# Patient Record
Sex: FEMALE | Race: OTHER | Employment: OTHER | URBAN - METROPOLITAN AREA
[De-identification: names, ages, dates, MRNs, and addresses within clinical notes are randomized per-mention and may not be internally consistent; named-entity substitution may affect disease eponyms.]

---

## 2017-02-07 ENCOUNTER — APPOINTMENT (OUTPATIENT)
Dept: CT IMAGING | Age: 82
End: 2017-02-07
Attending: EMERGENCY MEDICINE
Payer: MEDICARE

## 2017-02-07 ENCOUNTER — APPOINTMENT (OUTPATIENT)
Dept: GENERAL RADIOLOGY | Age: 82
End: 2017-02-07
Attending: EMERGENCY MEDICINE
Payer: MEDICARE

## 2017-02-07 ENCOUNTER — HOSPITAL ENCOUNTER (EMERGENCY)
Age: 82
Discharge: HOME | End: 2017-02-07
Attending: EMERGENCY MEDICINE
Payer: MEDICARE

## 2017-02-07 VITALS
HEIGHT: 64 IN | BODY MASS INDEX: 23.22 KG/M2 | SYSTOLIC BLOOD PRESSURE: 138 MMHG | DIASTOLIC BLOOD PRESSURE: 75 MMHG | HEART RATE: 68 BPM | WEIGHT: 136 LBS | OXYGEN SATURATION: 94 % | TEMPERATURE: 98.5 F | RESPIRATION RATE: 16 BRPM

## 2017-02-07 DIAGNOSIS — G45.9 TRANSIENT CEREBRAL ISCHEMIA, UNSPECIFIED TYPE: Primary | ICD-10-CM

## 2017-02-07 LAB
ALBUMIN SERPL BCP-MCNC: 4.2 G/DL (ref 3.5–5)
ALBUMIN/GLOB SERPL: 1.4 {RATIO} (ref 1.1–2.2)
ALP SERPL-CCNC: 85 U/L (ref 45–117)
ALT SERPL-CCNC: 25 U/L (ref 12–78)
ANION GAP BLD CALC-SCNC: 8 MMOL/L (ref 5–15)
APPEARANCE UR: CLEAR
APTT PPP: 26.7 SEC (ref 22.1–32.5)
AST SERPL W P-5'-P-CCNC: 22 U/L (ref 15–37)
BACTERIA URNS QL MICRO: NEGATIVE /HPF
BASOPHILS # BLD AUTO: 0 K/UL (ref 0–0.1)
BASOPHILS # BLD: 0 % (ref 0–1)
BILIRUB SERPL-MCNC: 0.5 MG/DL (ref 0.2–1)
BILIRUB UR QL: NEGATIVE
BNP SERPL-MCNC: 66 PG/ML (ref 0–100)
BUN SERPL-MCNC: 15 MG/DL (ref 6–20)
BUN/CREAT SERPL: 20 (ref 12–20)
CALCIUM SERPL-MCNC: 9.3 MG/DL (ref 8.5–10.1)
CHLORIDE SERPL-SCNC: 94 MMOL/L (ref 97–108)
CK SERPL-CCNC: 110 U/L (ref 26–192)
CO2 SERPL-SCNC: 30 MMOL/L (ref 21–32)
COLOR UR: ABNORMAL
CREAT SERPL-MCNC: 0.76 MG/DL (ref 0.55–1.02)
EOSINOPHIL # BLD: 0.3 K/UL (ref 0–0.4)
EOSINOPHIL NFR BLD: 5 % (ref 0–7)
EPITH CASTS URNS QL MICRO: ABNORMAL /LPF
ERYTHROCYTE [DISTWIDTH] IN BLOOD BY AUTOMATED COUNT: 12.6 % (ref 11.5–14.5)
GLOBULIN SER CALC-MCNC: 3.1 G/DL (ref 2–4)
GLUCOSE BLD STRIP.AUTO-MCNC: 94 MG/DL (ref 65–100)
GLUCOSE SERPL-MCNC: 92 MG/DL (ref 65–100)
GLUCOSE UR STRIP.AUTO-MCNC: NEGATIVE MG/DL
HCT VFR BLD AUTO: 40.3 % (ref 35–47)
HGB BLD-MCNC: 13.5 G/DL (ref 11.5–16)
HGB UR QL STRIP: NEGATIVE
HYALINE CASTS URNS QL MICRO: ABNORMAL /LPF (ref 0–5)
INR BLD: <0.9 (ref 0.9–1.2)
INR PPP: 1 (ref 0.9–1.1)
KETONES UR QL STRIP.AUTO: NEGATIVE MG/DL
LEUKOCYTE ESTERASE UR QL STRIP.AUTO: ABNORMAL
LYMPHOCYTES # BLD AUTO: 19 % (ref 12–49)
LYMPHOCYTES # BLD: 1.2 K/UL (ref 0.8–3.5)
MCH RBC QN AUTO: 32.8 PG (ref 26–34)
MCHC RBC AUTO-ENTMCNC: 33.5 G/DL (ref 30–36.5)
MCV RBC AUTO: 97.8 FL (ref 80–99)
MONOCYTES # BLD: 0.7 K/UL (ref 0–1)
MONOCYTES NFR BLD AUTO: 11 % (ref 5–13)
NEUTS SEG # BLD: 4 K/UL (ref 1.8–8)
NEUTS SEG NFR BLD AUTO: 65 % (ref 32–75)
NITRITE UR QL STRIP.AUTO: NEGATIVE
PH UR STRIP: 7.5 [PH] (ref 5–8)
PLATELET # BLD AUTO: 207 K/UL (ref 150–400)
POTASSIUM SERPL-SCNC: 3.8 MMOL/L (ref 3.5–5.1)
PROT SERPL-MCNC: 7.3 G/DL (ref 6.4–8.2)
PROT UR STRIP-MCNC: NEGATIVE MG/DL
PROTHROMBIN TIME: 10.7 SEC (ref 9–11.1)
RBC # BLD AUTO: 4.12 M/UL (ref 3.8–5.2)
RBC #/AREA URNS HPF: ABNORMAL /HPF (ref 0–5)
SERVICE CMNT-IMP: NORMAL
SODIUM SERPL-SCNC: 132 MMOL/L (ref 136–145)
SP GR UR REFRACTOMETRY: 1.01 (ref 1–1.03)
THERAPEUTIC RANGE,PTTT: NORMAL SECS (ref 58–77)
TROPONIN I SERPL-MCNC: <0.04 NG/ML
UA: UC IF INDICATED,UAUC: ABNORMAL
UROBILINOGEN UR QL STRIP.AUTO: 0.2 EU/DL (ref 0.2–1)
WBC # BLD AUTO: 6.1 K/UL (ref 3.6–11)
WBC URNS QL MICRO: ABNORMAL /HPF (ref 0–4)

## 2017-02-07 PROCEDURE — 85025 COMPLETE CBC W/AUTO DIFF WBC: CPT | Performed by: EMERGENCY MEDICINE

## 2017-02-07 PROCEDURE — 80053 COMPREHEN METABOLIC PANEL: CPT | Performed by: EMERGENCY MEDICINE

## 2017-02-07 PROCEDURE — 70450 CT HEAD/BRAIN W/O DYE: CPT

## 2017-02-07 PROCEDURE — 93005 ELECTROCARDIOGRAM TRACING: CPT

## 2017-02-07 PROCEDURE — 71010 XR CHEST PORT: CPT

## 2017-02-07 PROCEDURE — 82550 ASSAY OF CK (CPK): CPT | Performed by: EMERGENCY MEDICINE

## 2017-02-07 PROCEDURE — 81001 URINALYSIS AUTO W/SCOPE: CPT | Performed by: EMERGENCY MEDICINE

## 2017-02-07 PROCEDURE — 87086 URINE CULTURE/COLONY COUNT: CPT | Performed by: EMERGENCY MEDICINE

## 2017-02-07 PROCEDURE — 99285 EMERGENCY DEPT VISIT HI MDM: CPT

## 2017-02-07 PROCEDURE — 83880 ASSAY OF NATRIURETIC PEPTIDE: CPT | Performed by: EMERGENCY MEDICINE

## 2017-02-07 PROCEDURE — 85730 THROMBOPLASTIN TIME PARTIAL: CPT | Performed by: EMERGENCY MEDICINE

## 2017-02-07 PROCEDURE — 84484 ASSAY OF TROPONIN QUANT: CPT | Performed by: EMERGENCY MEDICINE

## 2017-02-07 PROCEDURE — 85610 PROTHROMBIN TIME: CPT | Performed by: EMERGENCY MEDICINE

## 2017-02-07 PROCEDURE — 85610 PROTHROMBIN TIME: CPT

## 2017-02-07 PROCEDURE — 82962 GLUCOSE BLOOD TEST: CPT

## 2017-02-07 PROCEDURE — 36415 COLL VENOUS BLD VENIPUNCTURE: CPT | Performed by: EMERGENCY MEDICINE

## 2017-02-07 NOTE — ED TRIAGE NOTES
She was awakened with a phone call from her son at 4:25pm, he reaziled she was having trouble with her speech and feeling confused. She recognized this too. He went to her house where she still seemed to be having problems. She says she has a bit of a headache and now also says she has pressure in her chest. She has a hx of TIA with the last one being in May 2015, same type symtoms.

## 2017-02-07 NOTE — ED PROVIDER NOTES
HPI Comments: [de-identified] y.o. female with past medical history significant for TIA, PVC who presents with chief complaint of aphasia. Per relative, while on the cell phone with the pt at 1625 (1.5 hours ago), the pt had aphasia in the form of word finding issues which lasted for one hour, even when other family members arrived on scene. Pt reports hx of similar TIA's and says that her last one was 2015 (2 years ago). She reports that her sxs have resolved upon ED arrival but that she has a small headache and a \"fullness\" in her chest but no actual CP currently. She reports being on blood thinners but does not remember which one. Pt denies facial droop, drooling, weakness, gait problem, CP. There are no other acute medical concerns at this time. PCP: Druscilla Gowers, MD    Note written by Sagar Strong, as dictated by Yoselin Temple MD 6:10 PM      The history is provided by the patient. Past Medical History:   Diagnosis Date    Arthritis     Glaucoma 12/18/2013    Hyperlipidemia 8/29/2012    Postmenopausal HRT (hormone replacement therapy) 8/29/2012    PVC (premature ventricular contraction) 1/7/2015    TIA (transient ischemic attack) 5/1/2015    Uterovaginal prolapse 2/3/2012       Past Surgical History:   Procedure Laterality Date    Hx orthopaedic       RT. BUNIONECTOMY    Hx orthopaedic       CALL'S NEUROMA    Hx hysterectomy       X 4    Hx gi       COLONOSCOPY    Hx breast biopsy       CARMEL.  Hx tonsillectomy           Family History:   Problem Relation Age of Onset    Heart Attack Mother 80       Social History     Social History    Marital status:      Spouse name: N/A    Number of children: N/A    Years of education: N/A     Occupational History    Not on file.      Social History Main Topics    Smoking status: Former Smoker     Packs/day: 1.00     Years: 14.00     Quit date: 1/27/1967    Smokeless tobacco: Never Used    Alcohol use Yes      Comment: AVG 2X A MONTH  Drug use: No    Sexual activity: Not on file     Other Topics Concern    Not on file     Social History Narrative         ALLERGIES: Review of patient's allergies indicates no known allergies. Review of Systems   Cardiovascular: Negative for chest pain. \"Chest fullness\"   Musculoskeletal: Negative for gait problem. Neurological: Positive for speech difficulty and headaches. Negative for facial asymmetry and weakness. Psychiatric/Behavioral: Positive for confusion. All other systems reviewed and are negative. Vitals:    02/07/17 1754 02/07/17 1900 [birth year]1937 02/07/17 1939   BP: 182/89 147/76 155/72    Pulse: 70 72  76   Resp: 20 17 14   Temp: 98.2 °F (36.8 °C)      SpO2: 98% 98%  100%   Weight: 61.7 kg (136 lb)      Height: 5' 4\" (1.626 m)               Physical Exam   Constitutional: She is oriented to person, place, and time. She appears well-developed and well-nourished. No distress. HENT:   Head: Normocephalic and atraumatic. Eyes: Conjunctivae are normal. No scleral icterus. Neck: Neck supple. Carotid bruit is not present. No tracheal deviation present. Cardiovascular: Normal rate, regular rhythm, normal heart sounds and intact distal pulses. Exam reveals no gallop and no friction rub. No murmur heard. No murmur heard   Pulmonary/Chest: Effort normal and breath sounds normal. She has no wheezes. She has no rales. Abdominal: Soft. She exhibits no distension. There is no tenderness. There is no rebound and no guarding. Musculoskeletal: She exhibits no edema. Neurological: She is alert and oriented to person, place, and time. She has normal strength. No cranial nerve deficit or sensory deficit. She displays a negative Romberg sign. No focal neuro deficits, no pronator drift, normal finger nose finger. No facial droop or aphasia currently. Skin: Skin is warm and dry. No rash noted. Psychiatric: She has a normal mood and affect.    Nursing note and vitals reviewed. Note written by Sagar Tanner, as dictated by Winnie Mejia MD 6:10 PM      [facility]  ED Course       Procedures    CONSULT NOTE:  6:17 PM Winnie Mejia MD spoke with Dr. Nikki Summers, Consult for Neurology. Discussed available diagnostic tests and clinical findings. He agrees to treat pt as TIA workup based on clinical history and recommends admission. CONSULT NOTE:  Valerie Escobar MD spoke with Dr. Estela Freeman, Consult for PCP. Discussed available diagnostic tests and clinical findings. ED EKG interpretation:  Rhythm: normal sinus rhythm; and regular . Rate (approx.): 65; Axis: normal; ST/T wave: no acute changes  Note written by Sagar Tanner, as dictated by Winnie Mejia MD 6:38 PM     PROGRESS NOTE:  7:31 PM  Dr. Matt Hernadez has seen and evaluated the pt, she has had multiple TIA's in the past and full workup, he recommends that pt does not need admission and will discharge pt home. PROGRESS NOTE:  8:19 PM  Pt was discharged per Dr. Matt Hernadez, we will give pt paperwork and she is to follow up with Dr. Matt Hernadez in one day.

## 2017-02-07 NOTE — PROGRESS NOTES
Patient in CT and no family present at time of visit    Grace Velasquez M.S., M.Div.   [address] [facility] ([id number])

## 2017-02-08 LAB
ATRIAL RATE: 65 BPM
CALCULATED P AXIS, ECG09: 81 DEGREES
CALCULATED R AXIS, ECG10: 17 DEGREES
CALCULATED T AXIS, ECG11: 33 DEGREES
DIAGNOSIS, 93000: NORMAL
P-R INTERVAL, ECG05: 148 MS
Q-T INTERVAL, ECG07: 410 MS
QRS DURATION, ECG06: 74 MS
QTC CALCULATION (BEZET), ECG08: 426 MS
VENTRICULAR RATE, ECG03: 65 BPM

## 2017-02-08 NOTE — DISCHARGE INSTRUCTIONS
We hope that we have addressed all of your medical concerns. The examination and treatment you received in the Emergency Department were for an emergent problem and were not intended as complete care. It is important that you follow up with your healthcare provider(s) for ongoing care. If your symptoms worsen or do not improve as expected, and you are unable to reach your usual health care provider(s), you should return to the Emergency Department. Today's healthcare is undergoing tremendous change, and patient satisfaction surveys are one of the many tools to assess the quality of medical care. You may receive a survey from the [employer] organization regarding your experience in the Emergency Department. I hope that your experience has been completely positive, particularly the medical care that I provided. As such, please participate in the survey; anything less than excellent does not meet my expectations or intentions. [facility] and [facility] participate in nationally recognized quality of care measures. If your blood pressure is greater than 120/80, as reported below, we urge that you seek medical care to address the potential of high blood pressure, commonly known as hypertension. Hypertension can be hereditary or can be caused by certain medical conditions, pain, stress, or \"white coat syndrome. \"       Please make an appointment with your health care provider(s) for follow up of your Emergency Department visit. VITALS:   Patient Vitals for the past 8 hrs:   Temp Pulse Resp BP SpO2   02/07/17 1939 - 76 14 - 100 %   [birth year]/1937 - - - 155/72 -   02/07/17 1900 - 72 17 147/76 98 %   02/07/17 1754 98.2 °F (36.8 °C) 70 20 182/89 98 %          Thank you for allowing us to provide you with medical care today. We realize that you have many choices for your emergency care needs.   Please choose us in the future for any continued health care nicanor Goemz MD    [address] [facility].   Office: [phone]            Recent Results (from the past 24 hour(s))   GLUCOSE, POC    Collection Time: 02/07/17  6:03 PM   Result Value Ref Range    Glucose (POC) 94 65 - 100 mg/dL    Performed by Douglsa Sharpe    POC INR    Collection Time: 02/07/17  6:03 PM   Result Value Ref Range    INR (POC) <0.9 <1.2   CBC WITH AUTOMATED DIFF    Collection Time: 02/07/17  6:23 PM   Result Value Ref Range    WBC 6.1 3.6 - 11.0 K/uL    RBC 4.12 3.80 - 5.20 M/uL    HGB 13.5 11.5 - 16.0 g/dL    HCT 40.3 35.0 - 47.0 %    MCV 97.8 80.0 - 99.0 FL    MCH 32.8 26.0 - 34.0 PG    MCHC 33.5 30.0 - 36.5 g/dL    RDW 12.6 11.5 - 14.5 %    PLATELET 202 472 - 852 K/uL    NEUTROPHILS 65 32 - 75 %    LYMPHOCYTES 19 12 - 49 %    MONOCYTES 11 5 - 13 %    EOSINOPHILS 5 0 - 7 %    BASOPHILS 0 0 - 1 %    ABS. NEUTROPHILS 4.0 1.8 - 8.0 K/UL    ABS. LYMPHOCYTES 1.2 0.8 - 3.5 K/UL    ABS. MONOCYTES 0.7 0.0 - 1.0 K/UL    ABS. EOSINOPHILS 0.3 0.0 - 0.4 K/UL    ABS. BASOPHILS 0.0 0.0 - 0.1 K/UL   METABOLIC PANEL, COMPREHENSIVE    Collection Time: 02/07/17  6:23 PM   Result Value Ref Range    Sodium 132 (L) 136 - 145 mmol/L    Potassium 3.8 3.5 - 5.1 mmol/L    Chloride 94 (L) 97 - 108 mmol/L    CO2 30 21 - 32 mmol/L    Anion gap 8 5 - 15 mmol/L    Glucose 92 65 - 100 mg/dL    BUN 15 6 - 20 MG/DL    Creatinine 0.76 0.55 - 1.02 MG/DL    BUN/Creatinine ratio 20 12 - 20      GFR est AA >60 >60 ml/min/1.73m2    GFR est non-AA >60 >60 ml/min/1.73m2    Calcium 9.3 8.5 - 10.1 MG/DL    Bilirubin, total 0.5 0.2 - 1.0 MG/DL    ALT (SGPT) 25 12 - 78 U/L    AST (SGOT) 22 15 - 37 U/L    Alk.  phosphatase 85 45 - 117 U/L    Protein, total 7.3 6.4 - 8.2 g/dL    Albumin 4.2 3.5 - 5.0 g/dL    Globulin 3.1 2.0 - 4.0 g/dL    A-G Ratio 1.4 1.1 - 2.2     CK W/ REFLX CKMB    Collection Time: 02/07/17  6:23 PM   Result Value Ref Range     26 - 192 U/L   TROPONIN I Collection Time: 02/07/17  6:23 PM   Result Value Ref Range    Troponin-I, Qt. <0.04 <0.05 ng/mL   PTT    Collection Time: 02/07/17  6:23 PM   Result Value Ref Range    aPTT 26.7 22.1 - 32.5 sec    aPTT, therapeutic range     58.0 - 77.0 SECS   PROTHROMBIN TIME + INR    Collection Time: 02/07/17  6:23 PM   Result Value Ref Range    INR 1.0 0.9 - 1.1      Prothrombin time 10.7 9.0 - 11.1 sec   BNP    Collection Time: 02/07/17  6:23 PM   Result Value Ref Range    BNP 66 0 - 100 pg/mL   EKG, 12 LEAD, INITIAL    Collection Time: 02/07/17  6:25 PM   Result Value Ref Range    Ventricular Rate 65 BPM    Atrial Rate 65 BPM    P-R Interval 148 ms    QRS Duration 74 ms    Q-T Interval 410 ms    QTC Calculation (Bezet) 426 ms    Calculated P Axis 81 degrees    Calculated R Axis 17 degrees    Calculated T Axis 33 degrees    Diagnosis       Normal sinus rhythm  When compared with ECG of 01-MAY-2015 11:16,  No significant change was found     URINALYSIS W/ REFLEX CULTURE    Collection Time: 02/07/17  7:46 PM   Result Value Ref Range    Color YELLOW/STRAW      Appearance CLEAR CLEAR      Specific gravity 1.009 1.003 - 1.030      pH (UA) 7.5 5.0 - 8.0      Protein NEGATIVE  NEG mg/dL    Glucose NEGATIVE  NEG mg/dL    Ketone NEGATIVE  NEG mg/dL    Bilirubin NEGATIVE  NEG      Blood NEGATIVE  NEG      Urobilinogen 0.2 0.2 - 1.0 EU/dL    Nitrites NEGATIVE  NEG      Leukocyte Esterase LARGE (A) NEG      WBC 10-20 0 - 4 /hpf    RBC 0-5 0 - 5 /hpf    Epithelial cells FEW FEW /lpf    Bacteria NEGATIVE  NEG /hpf    UA:UC IF INDICATED URINE CULTURE ORDERED (A) CNI      Hyaline cast 0-2 0 - 5 /lpf       Xr Chest Port    Result Date: 2/7/2017  INDICATION: Garbled speech and confusion. TIA. COMPARISON: 5/1/2015 A single frontal view was obtained. The time of this study is 1926 hours. Lungs are clear. Heart size normal. Atherosclerotic change of the aorta is noted. ..      IMPRESSION: No acute finding    Ct Code Neuro Head Wo Contrast    Result Date: 2/7/2017  EXAM:  CT CODE NEURO HEAD WO CONTRAST INDICATION:   confusion since 4:25pm COMPARISON: 5/1/2015. TECHNIQUE: Unenhanced CT of the head was performed using 5 mm images. Brain and bone windows were generated. Sagittal and coronal reformatted images were also generated. CT dose reduction was achieved through the use of a standardized protocol tailored for this examination and automatic exposure control for dose modulation. FINDINGS: The ventricles and sulci are normal in size, shape and configuration and midline. There is mild hypodensity of the cerebral white matter, likely due to intracranial small vessel disease. No hyperdense vessels are seen. . There is no intracranial hemorrhage, extra-axial collection, mass, mass effect or midline shift. The basilar cisterns are open. No acute infarct is identified. The bone windows demonstrate no abnormalities. The visualized portions of the paranasal sinuses and mastoid air cells are clear. IMPRESSION: No acute findings. No change since the previous study.

## 2017-02-08 NOTE — CONSULTS
History and Physical    Subjective:     Dang Anderson is a [de-identified] y.o. WF with 8 year hx of recurrent spells/TIA with episodes of confusion, slurring of speech treated initially with asa and for the past 2 yrs plavix. Today sons observed a 60 minute spelll of expressive aphasia after gardening, She has made a full return to baseline since arriving in er and head ct wnl Carotid flow studies have been unremarkable in the past She is maintained on statin,. During myi interiview she can rapidly say mon of the year in reverse, Ekg is unemarkable. Lengthy discussion with patient I offered observation but they prefer to return home. Past Medical History   Diagnosis Date    Arthritis     Glaucoma 12/18/2013    Hyperlipidemia 8/29/2012    Postmenopausal HRT (hormone replacement therapy) 8/29/2012    PVC (premature ventricular contraction) 1/7/2015    TIA (transient ischemic attack) 5/1/2015    Uterovaginal prolapse 2/3/2012      Past Surgical History   Procedure Laterality Date    Hx orthopaedic       RT. BUNIONECTOMY    Hx orthopaedic       CALL'S NEUROMA    Hx hysterectomy       X 4    Hx gi       COLONOSCOPY    Hx breast biopsy       CARMEL.  Hx tonsillectomy       Family History   Problem Relation Age of Onset    Heart Attack Mother 80      Social History   Substance Use Topics    Smoking status: Former Smoker     Packs/day: 1.00     Years: 14.00     Quit date: 1/27/1967    Smokeless tobacco: Never Used    Alcohol use Yes      Comment: AVG 2X A MONTH       Prior to Admission medications    Medication Sig Start Date End Date Taking? Authorizing Provider   clopidogrel (PLAVIX) 75 mg tablet Take 1 Tab by mouth daily. 8/22/16   Pinky Damon MD   simvastatin (ZOCOR) 40 mg tablet TAKE 1 TABLET NIGHTLY 1/13/16   C Mikel Morris MD   oxybutynin (DITROPAN) 5 mg tablet Take 1 Tab by mouth three (3) times daily.  11/13/15   Pinky Damon MD   latanoprost (XALATAN) 0.005 % ophthalmic solution Administer 1 Drop to both eyes nightly. Historical Provider     No Known Allergies     Review of Systems:  A comprehensive review of systems was negative except for that written in the History of Present Illness. Code status     Objective:   Patient Vitals for the past 8 hrs:   BP Temp Pulse Resp SpO2 Height Weight   02/07/17 1900 147/76 - 72 17 98 % - -   02/07/17 1754 182/89 98.2 °F (36.8 °C) 70 20 98 % 5' 4\" (1.626 m) 136 lb (61.7 kg)         Physical Exam:   Heent -perrla eom intadt  Neck -carotiids 2+ no bruit  Breasts-  Lungs -clear  Heart -reg, no murmur  Abd -neg  Ext -no edema  Neuro-fully intadt        Data Review:   Recent Results (from the past 24 hour(s))   GLUCOSE, POC    Collection Time: 02/07/17  6:03 PM   Result Value Ref Range    Glucose (POC) 94 65 - 100 mg/dL    Performed by Geovanna Sands    POC INR    Collection Time: 02/07/17  6:03 PM   Result Value Ref Range    INR (POC) <0.9 <1.2   CBC WITH AUTOMATED DIFF    Collection Time: 02/07/17  6:23 PM   Result Value Ref Range    WBC 6.1 3.6 - 11.0 K/uL    RBC 4.12 3.80 - 5.20 M/uL    HGB 13.5 11.5 - 16.0 g/dL    HCT 40.3 35.0 - 47.0 %    MCV 97.8 80.0 - 99.0 FL    MCH 32.8 26.0 - 34.0 PG    MCHC 33.5 30.0 - 36.5 g/dL    RDW 12.6 11.5 - 14.5 %    PLATELET 369 047 - 552 K/uL    NEUTROPHILS 65 32 - 75 %    LYMPHOCYTES 19 12 - 49 %    MONOCYTES 11 5 - 13 %    EOSINOPHILS 5 0 - 7 %    BASOPHILS 0 0 - 1 %    ABS. NEUTROPHILS 4.0 1.8 - 8.0 K/UL    ABS. LYMPHOCYTES 1.2 0.8 - 3.5 K/UL    ABS. MONOCYTES 0.7 0.0 - 1.0 K/UL    ABS. EOSINOPHILS 0.3 0.0 - 0.4 K/UL    ABS.  BASOPHILS 0.0 0.0 - 0.1 K/UL   METABOLIC PANEL, COMPREHENSIVE    Collection Time: 02/07/17  6:23 PM   Result Value Ref Range    Sodium 132 (L) 136 - 145 mmol/L    Potassium 3.8 3.5 - 5.1 mmol/L    Chloride 94 (L) 97 - 108 mmol/L    CO2 30 21 - 32 mmol/L    Anion gap 8 5 - 15 mmol/L    Glucose 92 65 - 100 mg/dL    BUN 15 6 - 20 MG/DL    Creatinine 0.76 0.55 - 1.02 MG/DL    BUN/Creatinine ratio 20 12 - 20      GFR est AA >60 >60 ml/min/1.73m2    GFR est non-AA >60 >60 ml/min/1.73m2    Calcium 9.3 8.5 - 10.1 MG/DL    Bilirubin, total 0.5 0.2 - 1.0 MG/DL    ALT (SGPT) 25 12 - 78 U/L    AST (SGOT) 22 15 - 37 U/L    Alk. phosphatase 85 45 - 117 U/L    Protein, total 7.3 6.4 - 8.2 g/dL    Albumin 4.2 3.5 - 5.0 g/dL    Globulin 3.1 2.0 - 4.0 g/dL    A-G Ratio 1.4 1.1 - 2.2     CK W/ REFLX CKMB    Collection Time: 02/07/17  6:23 PM   Result Value Ref Range     26 - 192 U/L   TROPONIN I    Collection Time: 02/07/17  6:23 PM   Result Value Ref Range    Troponin-I, Qt. <0.04 <0.05 ng/mL   PTT    Collection Time: 02/07/17  6:23 PM   Result Value Ref Range    aPTT 26.7 22.1 - 32.5 sec    aPTT, therapeutic range     58.0 - 77.0 SECS   PROTHROMBIN TIME + INR    Collection Time: 02/07/17  6:23 PM   Result Value Ref Range    INR 1.0 0.9 - 1.1      Prothrombin time 10.7 9.0 - 11.1 sec   EKG, 12 LEAD, INITIAL    Collection Time: 02/07/17  6:25 PM   Result Value Ref Range    Ventricular Rate 65 BPM    Atrial Rate 65 BPM    P-R Interval 148 ms    QRS Duration 74 ms    Q-T Interval 410 ms    QTC Calculation (Bezet) 426 ms    Calculated P Axis 81 degrees    Calculated R Axis 17 degrees    Calculated T Axis 33 degrees    Diagnosis       Normal sinus rhythm  When compared with ECG of 01-MAY-2015 11:16,  No significant change was found         Assessment:     Principal Problem:    TIA (transient ischemic attack) (5/1/2015)        Plan:      Add babv asa and will see in the office 6 days no driving for time being    Signed By: Zo Patel MD     February 7, 2017

## 2017-02-09 LAB
BACTERIA SPEC CULT: NORMAL
CC UR VC: NORMAL
SERVICE CMNT-IMP: NORMAL

## 2018-05-09 ENCOUNTER — OFFICE VISIT (OUTPATIENT)
Dept: NEUROLOGY | Age: 83
End: 2018-05-09

## 2018-05-09 VITALS
HEART RATE: 56 BPM | SYSTOLIC BLOOD PRESSURE: 130 MMHG | OXYGEN SATURATION: 98 % | WEIGHT: 137 LBS | BODY MASS INDEX: 23.52 KG/M2 | DIASTOLIC BLOOD PRESSURE: 72 MMHG | RESPIRATION RATE: 18 BRPM

## 2018-05-09 DIAGNOSIS — G45.1 HEMISPHERIC CAROTID ARTERY SYNDROME: Primary | ICD-10-CM

## 2018-05-09 DIAGNOSIS — E78.5 HYPERLIPIDEMIA, UNSPECIFIED HYPERLIPIDEMIA TYPE: ICD-10-CM

## 2018-05-09 DIAGNOSIS — G60.9 IDIOPATHIC POLYNEUROPATHY: ICD-10-CM

## 2018-05-09 NOTE — PROGRESS NOTES
NEUROSCIENCE [facility]   NEW PATIENT EVALUATION/CONSULTATION       PATIENT NAME: Medina Gonzalez    MRN: [id number]    REASON FOR CONSULTATION: TIA/Stroke    05/09/18      Previous records (physician notes, laboratory reports, and radiology reports) and imaging studies were reviewed and summarized. My recommendations will be communicated back to the patient's physician(s) via electronic medical record and/or by [address] mail. HISTORY OF PRESENT ILLNESS:  Medina Gonzalez is a 82 y.o. right handed female presenting for evaluation of TIA/Stroke. Pt reports 1 week ago she awoke without sx. She started experiencing a severe occipital headache, slight nausea. She went to sleep and awoke later with persistent headache. Her friend who is a retired dentist noted that her speech was slightly irregular (fluent aphasia) that day. No noted focal weakness, paresthesias, dysarthria, vision deficits. Her headache was improved the following day and speech was back to baseline. She had been taking ASA daily up until her recent PCP visit. Now on Plavix monotherapy and doing well this. No recurrence of speech deficits. Of note, she has had a h/o TIAs in 2009, 2015 and 2017. Her prior events were all associated with fluent aphasia as well as severe headache. Neuroimaging has been negative during previous evaluations. PAST MEDICAL HISTORY:  Past Medical History:   Diagnosis Date    Arthritis     Cancer ([facility] [address]     squamous cell left leg    Glaucoma 12/18/2013    Hyperlipidemia 8/29/2012    Postmenopausal HRT (hormone replacement therapy) 8/29/2012    PVC (premature ventricular contraction) 1/7/2015    TIA (transient ischemic attack) 5/1/2015    Uterovaginal prolapse 2/3/2012       PAST SURGICAL HISTORY:  Past Surgical History:   Procedure Laterality Date    HX BREAST BIOPSY      CARMEL.     HX GI      COLONOSCOPY    HX HYSTERECTOMY      X 4    HX ORTHOPAEDIC      RT. BUNIONECTOMY    HX ORTHOPAEDIC      CALL'S NEUROMA    HX TONSILLECTOMY         FAMILY HISTORY:   Family History   Problem Relation Age of Onset    Heart Attack Mother 80         SOCIAL HISTORY:  Social History     Social History    Marital status:      Spouse name: N/A    Number of children: N/A    Years of education: N/A     Social History Main Topics    Smoking status: Former Smoker     Packs/day: 1.00     Years: 14.00     Quit date: 1/27/1967    Smokeless tobacco: Never Used    Alcohol use Yes      Comment: AVG 2X A MONTH    Drug use: No    Sexual activity: Not on file     Other Topics Concern    Not on file     Social History Narrative         MEDICATIONS:   Current Outpatient Prescriptions   Medication Sig Dispense Refill    clopidogrel (PLAVIX) 75 mg tab Take 1 Tab by mouth daily. 30 Tab 11    oxybutynin (DITROPAN) 5 mg tablet TAKE 1 TABLET THREE TIMES A  Tab 2    PARoxetine (PAXIL) 10 mg tablet Take 1 Tab by mouth daily. 90 Tab 4    simvastatin (ZOCOR) 40 mg tablet TAKE 1 TABLET NIGHTLY 90 Tab 4    aspirin 81 mg chewable tablet Take 2 Tabs by mouth daily. 100 Tab 11    latanoprost (XALATAN) 0.005 % ophthalmic solution Administer 1 Drop to both eyes nightly. ALLERGIES:  No Known Allergies      REVIEW OF SYSTEMS:  10 point ROS reviewed with patient. Please see scanned document under media. PHYSICAL EXAM:  Vital Signs:   Visit Vitals    /72    Pulse (!) 56    Resp 18    Wt 62.1 kg (137 lb)    SpO2 98%    BMI 23.52 kg/m2        General Medical Exam:  General:  Well appearing, comfortable, in no apparent distress. Eyes/ENT: see cranial nerve examination. Neck: No masses appreciated. Full range of motion without tenderness. Respiratory:  Clear to auscultation, good air entry bilaterally. Cardiac:  Regular rate and rhythm, no murmur. GI:  Soft, non-tender, non-distended abdomen. Bowel sounds normal. No masses, organomegaly. Extremities:  No deformities, edema, or skin discoloration. Skin: No rashes or lesions. Neurological:  · Mental Status:  Alert and oriented to person, place, and time with fluent speech. · Cranial Nerves:   CNII/III/IV/VI: visual fields full to confrontation, EOMI, PERRL, no ptosis or nystagmus. CN V: Facial sensation intact bilaterally, masseter 5/5   CN VII: Facial muscles symmetric and strong   CN VIII: Hears finger rub well bilaterally, intact vestibular function   CN IX/X: Normal palatal movement   CN XI: Full strength shoulder shrug bilaterally   CN XII: Tongue protrusion full and midline without fasciculation or atrophy  · Motor: Normal tone and muscle bulk with no pronator drift. No atrophy or fasciculations present on examination. Individual muscle group testing:  Shoulder abduction:   Left:5/5   Right : 5/5    Shoulder adduction:   Left:5/5   Right : 5/5    Elbow flexion:      Left:5/5   Right : 5/5  Elbow extension:    Left:5/5   Right : 5/5   Wrist flexion:    Left:5/5   Right : 5/5  Wrist extension:    Left:5/5   Right : 5/5  Arm pronation:   Left:5/5   Right : 5/5  Arm supination:   Left:5/5   Right : 5/5    Finger flexion:    Left:5/5   Right : 5/5    Finger extension:   Left:5/5   Right : 5/5   Finger abduction:  Left:5/5   Right : 5/5   Finger adduction:   Left:5/5   Right : 5/5  Hip flexion:     Left:5/5   Right : 5/5         Hip extension:   Left:5/5   Right : 5/5    Knee flexion:     Left:5/5   Right : 5/5    Knee extension:   Left:5/5   Right : 5/5    Dorsiflexion:     Left:5/5   Right : 5/5  Plantar flexion:    Left:5/5   Right : 5/5      · MSRs: No crossed adductors or clonus. RIGHT  LEFT   Brachioradialis 2+ 2+   Biceps 2+ 2+   Triceps 2+ 2+   Knee 2+ 2+   Achilles 0 0        Plantar response Downward Downward          · Sensation: Absent vibration to knees, decreased pinprick to mid calves, preserved proprioception/LT; (-) Romberg. · Coordination: No dysmetria.  Normal rapid alternating movements; finger-to-nose and heel-to- shin testing are within normal limits. · Gait: Normal native gait    PERTINENT DATA:  INTERNAL RECORDS:  The patient's electronic medical record was reviewed. The relevant details include:  CT Results (maximum last 3): Results from [place] encounter on 02/07/17   CT CODE NEURO HEAD WO CONTRAST   Narrative EXAM:  CT CODE NEURO HEAD WO CONTRAST    INDICATION:   confusion since 4:25pm    COMPARISON: 5/1/2015. TECHNIQUE: Unenhanced CT of the head was performed using 5 mm images. Brain and  bone windows were generated. Sagittal and coronal reformatted images were also  generated. CT dose reduction was achieved through the use of a standardized  protocol tailored for this examination and automatic exposure control for dose  modulation. FINDINGS:  The ventricles and sulci are normal in size, shape and configuration and  midline. There is mild hypodensity of the cerebral white matter, likely due to  intracranial small vessel disease. No hyperdense vessels are seen. . There is no  intracranial hemorrhage, extra-axial collection, mass, mass effect or midline  shift. The basilar cisterns are open. No acute infarct is identified. The bone  windows demonstrate no abnormalities. The visualized portions of the paranasal  sinuses and mastoid air cells are clear. Impression IMPRESSION: No acute findings. No change since the previous study. MRI Results (maximum last 3): Results from [place] encounter on 05/01/15   MRI BRAIN W WO CONT   Narrative **Final Report**      ICD Codes / Adm. Diagnosis: 435.9  929232 / Unspecified transient cerebral    Lethargy  Examination:  MR BRAIN W AND WO CON  - [id number] - May  1 2015  9:38PM  Accession No:  [id number]  Reason:  CVA      REPORT:  *PRELIMINARY REPORT*    No visualized emergent process. Preliminary report was provided by Dr. Darling Ashton. Final report to follow. *END PRELIMINARY REPORT*    INDICATION: CVA.   EXAM: Sagittal and axial and coronal images were obtained through the brain   in varying sequences. T1-weighted, T2-weighted, FLAIR, GRE,   Diffusion-weighted sequences may be utilized. Images were obtained before and after IV contrast administration. FINDINGS:    Comparison study: 3/27/2009. No    Sagittal images demonstrate signal void to be present in the cavernous   carotid arteries bilaterally. The cerebellar tonsils are in a normal   position. Axial and coronal images demonstrate multifocal and confluent hyperintensity   in the periventricular deep white matter and centrum semiovale likely   microvascular changes related to aging. No confluent infarct or hemorrhage   or mass effect are identified. Images through the visualized orbits and   sella and cavernous sinus and ventricles are within normal limits. Postcontrast images demonstrate no specific abnormal enhancement. Diffusion-weighted images demonstrate no acute infarct. IMPRESSION:    No acute infarct or hemorrhage or mass effect. Signing/Reading Doctor: Elvin Ortiz ([id number])    Approved: Barbara Do ([id number])  May  2 2015  5:40AM                              Results from [facility] encounter on 03/26/09   MRA NECK W/ CONT   Narrative Final Report          ICD Codes / Adm. Diagnosis:    /   Verl Suyapa  Examination:  NECK MRA W CONTRAST  - [id number] - Mar 28 2009  9:58AM    Accession No:  [id number]  Reason:  TIA VS CVA      REPORT:  HISTORY: Stroke. COMMENTS: Following IV administration 20 mL Magnevist, MR arteriography of   the neck is performed with multiplanar reformations. There is a normal appearance of the flow pattern of the great vessels at   their origins. There is normal flow shown throughout the vertebral arteries. There is normal flow demonstrated in the common carotid arteries   bilaterally; there is no substantial stenosis of the internal or external   carotid arteries.        IMPRESSION: Normal MR arteriography of the neck. Interpreting/Reading Doctor: Jen Alberto ([id number])  Transcribed: n/a on 03/28/2009  Approved: ROSEMARY Alberto ([id number])  03/28/2009          Distribution:  Attending Doctor: Love Britt  Alternate Doctor: Love Britt          MRA HEAD W/O CONT   Narrative Final Report          ICD Codes / Adm. Diagnosis:    /   Jerman Jarquin CONTRAST  - [id number] - Mar 28 2009  9:58AM    Accession No:  [id number]  Reason:  TIA VS CVA      REPORT:  HISTORY: Stroke. Headaches. Impaired vision. COMMENT: Unenhanced MR arteriography of the head is performed with   multiplanar reformations. Codominant vertebral arteries and basilar artery show normal flow as do the   posterior cerebral arteries. There is normal flow in the internal carotid   artery siphons as well as the anterior and middle cerebral arteries   bilaterally. IMPRESSION: Normal MR arteriogram of the [place]. Interpreting/Reading Doctor: Jen Alberto ([id number])  Transcribed: n/a on 03/28/2009  Approved: ROSEMARY Alberto ([id number])  03/28/2009          Distribution:  Attending Doctor: Love Britt  Alternate Doctor: Love Britt            VAS/US Results (maximum last 3): Results from [place] encounter on 05/01/15   DUPLEX CAROTID BILATERAL      ASSESSMENT:      ICD-10-CM ICD-9-CM    1. Hemispheric carotid artery syndrome G45.1 435.8 MRI BRAIN WO CONT      MRA BRAIN WO CONT      MRA NECK WO CONT      2D ECHO COMPLETE ADULT (TTE) W OR WO CONTR      HEMOGLOBIN A1C W/O EAG      LIPID PANEL      VITAMIN B12      IMMUNOELECTROPHORESIS (IMMUNOFIX.)   2. Hyperlipidemia, unspecified hyperlipidemia type E78.5 272.4 MRI BRAIN WO CONT      MRA BRAIN WO CONT      MRA NECK WO CONT      2D ECHO COMPLETE ADULT (TTE) W OR WO CONTR      HEMOGLOBIN A1C W/O EAG      LIPID PANEL      VITAMIN B12      IMMUNOELECTROPHORESIS (IMMUNOFIX.)   3.  Idiopathic polyneuropathy G60.9 356.9 VITAMIN B12   83 year old RHF with a h/o HPL, TIAs, PVCs, glaucoma presenting for evaluation of transient fluent aphasia 1 week ago lasting <24h with associated severe occipital headache which has presently resolved. Neurological examination is non-focal apart from mixed large/small fiber sensory deficits involving the distal lower extremities c/w a length-dependent generalized polyneuropathy. Her presentation is concerning for L hemispheric TIA. She has experienced similar presentation in the past (2009, 2015, 2017) also associated with transient fluent aphasia and headache. While migraine variant with associated aphasia is a possibility, this would be a diagnosis of exclusion. She was instructed to seek immediate medical attention with any acute focal neurologic deficits in the future. Agree with transition to Plavix monotherapy for stroke prevention. Will complete TIA/stroke work up as outlined below. Additional laboratory investigations have also been ordered in search of reversible metabolic etiologies for her length-dependent polyneuropathy noted on examination. PLAN:  · HbA1C, FLP, B12, SWATI  · Cont. Plavix, statin therapy for stroke prevention  · MRI Brain/MRA Head and Neck WO  · TTE w/bubble, EKG (previously ordered)  · Extended cardiac monitoring given intermittent palpitations  · Stroke guidelines as outlined below      Follow-up Disposition:  Return in about 2 months (around 7/9/2018).     Guidelines for TIA/Stroke prevention:  Hypertension:   Anti-hypertensive therapy to lower SBP <140 and DBP <90  Further reduction to SBP < 130 mmHg may be beneficial, such as those with intracranial atherosclerosis  Hyperlipidemia:   Statin therapy with high-intensity statins in patients with ischemic stroke or TIA presumed to be of atherosclerotic origin and LDL >70mg/dl  High intensity statins: atorvastatin 40-80mg daily and rosuvastatin 20-40mg daily  Diabetes:   Screen with fasting plasma glucose, HbA1C, or oral glucose tolerance  Glycemic control target HbA1C < 7.0%  Smoking:   Strongly advise patient to quit smoking  Counseling, nicotine products, and oral smoking cessation medication are effective in achieving smoking cessation. Physical Activity and Obesity:   Screen for obesity with measurement of BMI  Regular, at least 3-4 times per week of aerobic exercise per week for an average of 40 minutes per session with moderate (ex. brisk walk) or vigorous (ex. jogging) intensity. Nutrition:   Reduction of sodium intake to approximately 2.4g grams per day. Reduction to <1.5 grams per day is associated with greater blood pressure reduction  Diet high in vegetables, fruits, whole grains, low fat dairy products, poultry, fish, legumes, olive oils, nuts. Limit intake of sweets, sugar sweetened/artificially sweetened beverages, and red meats. Eliceo Botello Led, DO  Staff Neurologist  Diplomate, American Board of Psychiatry & Neurology       CC Referring provider:    Shemar Dhillon MD

## 2018-05-09 NOTE — Clinical Note
Dr. Ines Hendrickson- thank you for the referral. For her extended cardiac monitoring, is this something you could assist with or should I refer her to cardiology?  Thank you-  Zach Arnold

## 2018-05-09 NOTE — MR AVS SNAPSHOT
[facility] [address] [place] [address] 
[phone] Patient: Leo De La Torre MRN: [id number] KARI:[birth year]321 Visit Information Date & Time Provider Department Dept. Phone Encounter #  
 5/9/2018 11:00 AM DO Lisa Soto [facility] at [address] [id number] Follow-up Instructions Return in about 2 months (around 7/9/2018). Your Appointments 7/20/2018  1:15 PM  
ESTABLISHED PATIENT with MD Toby Swanson [place], [address] ([address]) Appt Note: Physical Exam  
 [address] [facility] 7 [address]  
[phone]  
  
   
 [address] [facility] 7 [address] Upcoming Health Maintenance Date Due DTaP/Tdap/Td series (1 - Tdap) 1/22/1956 MEDICARE YEARLY EXAM 3/14/2018 GLAUCOMA SCREENING Q2Y 7/13/2018 Influenza Age 5 to Adult 8/1/2018 Allergies as of 5/9/2018  Review Complete On: 5/9/2018 By: Mac Gallardo DO No Known Allergies Current Immunizations  Reviewed on 8/28/2017 Name Date Influenza High Dose Vaccine PF 10/9/2017 Pneumococcal Conjugate (PCV-13) 1/5/2018 ZZZ-RETIRED (DO NOT USE) Pneumococcal Vaccine (Unspecified Type) 8/29/2006 Zoster Recombinant 4/12/2018 Zoster Vaccine, Live 12/18/2012 Not reviewed this visit You Were Diagnosed With   
  
 Codes Comments Hemispheric carotid artery syndrome    -  Primary ICD-10-CM: G45.1 ICD-9-CM: 435.8 Hyperlipidemia, unspecified hyperlipidemia type     ICD-10-CM: E78.5 ICD-9-CM: 272.4 Idiopathic polyneuropathy     ICD-10-CM: G60.9 ICD-9-CM: 356.9 Vitals BP Pulse Resp Weight(growth percentile) SpO2 BMI  
 130/72 (!) 56 18 137 lb (62.1 kg) 98% 23.52 kg/m2 OB Status Smoking Status Postmenopausal Former Smoker BMI and BSA Data  Body Mass Index Body Surface Area  
 23.52 kg/m 2 1.67 m 2  
  
  
 Preferred Pharmacy Pharmacy Name Phone [facility] [address] [phone] Your Updated Medication List  
  
   
This list is accurate as of 5/9/18 11:37 AM.  Always use your most recent med list.  
  
  
  
  
 aspirin 81 mg chewable tablet Take 2 Tabs by mouth daily. clopidogrel 75 mg Tab Commonly known as:  PLAVIX Take 1 Tab by mouth daily. latanoprost 0.005 % ophthalmic solution Commonly known as:  Marina Illi Administer 1 Drop to both eyes nightly. oxybutynin 5 mg tablet Commonly known as:  DITROPAN  
TAKE 1 TABLET THREE TIMES A DAY PARoxetine 10 mg tablet Commonly known as:  PAXIL Take 1 Tab by mouth daily. simvastatin 40 mg tablet Commonly known as:  ZOCOR  
TAKE 1 TABLET NIGHTLY We Performed the Following HEMOGLOBIN A1C W/O EAG [02472 CPT(R)] IMMUNOELECTROPHORESIS [facility].) [id number] CPT(R)] LIPID PANEL [71332 CPT(R)] VITAMIN B12 [id number] CPT(R)] Follow-up Instructions Return in about 2 months (around 7/9/2018). To-Do List   
 05/09/2018 ECHO:  2D ECHO COMPLETE ADULT (TTE) W OR WO CONTR   
  
 05/09/2018 Imaging:  MRA BRAIN WO CONT   
  
 05/09/2018 Imaging:  MRA NECK WO CONT   
  
 05/09/2018 Imaging:  MRI BRAIN WO CONT [facility]! Dear Jose Miguel Vargas: Thank you for requesting a [employer] account. Our records indicate that you already have an active [employer] account. You can access your account anytime at [email or web address]. [employer] Did you know that you can access your hospital and ER discharge instructions at any time in [employer]? You can also review all of your test results from your hospital stay or ER visit. Additional Information If you have questions, please visit the Frequently Asked Questions section of the Cam[employer] website at [email or web address]. [employer]/. Remember, MyChart is NOT to be used for urgent needs. For medical emergencies, dial 911. Now available from your iPhone and Android! Please provide this summary of care documentation to your next provider. Your primary care clinician is listed as GETACHEW Gonsales. If you have any questions after today's visit, please call [phone].

## 2018-05-23 LAB
CHOLEST SERPL-MCNC: 203 MG/DL (ref 100–199)
HBA1C MFR BLD: 5.7 % (ref 4.8–5.6)
HDLC SERPL-MCNC: 67 MG/DL
IGA SERPL-MCNC: 164 MG/DL (ref 64–422)
IGG SERPL-MCNC: 630 MG/DL (ref 700–1600)
IGM SERPL-MCNC: 59 MG/DL (ref 26–217)
LDLC SERPL CALC-MCNC: 123 MG/DL (ref 0–99)
PROT PATTERN SERPL IFE-IMP: ABNORMAL
TRIGL SERPL-MCNC: 64 MG/DL (ref 0–149)
VIT B12 SERPL-MCNC: 514 PG/ML (ref 232–1245)
VLDLC SERPL CALC-MCNC: 13 MG/DL (ref 5–40)

## 2018-06-12 PROBLEM — K21.9 GASTROESOPHAGEAL REFLUX DISEASE WITHOUT ESOPHAGITIS: Status: ACTIVE | Noted: 2018-06-12

## 2018-07-13 ENCOUNTER — TELEPHONE (OUTPATIENT)
Dept: NEUROLOGY | Age: 83
End: 2018-07-13

## 2018-07-16 ENCOUNTER — TELEPHONE (OUTPATIENT)
Dept: NEUROLOGY | Age: 83
End: 2018-07-16

## 2018-07-17 ENCOUNTER — TELEPHONE (OUTPATIENT)
Dept: NEUROLOGY | Age: 83
End: 2018-07-17

## 2018-07-17 NOTE — TELEPHONE ENCOUNTER
Called patient to inform her she needs to get MRI done before follow up on 7/20 with . No answer/VM.

## 2018-07-17 NOTE — TELEPHONE ENCOUNTER
To have all 4 appts done on the same day pt can't get appt until the 1st week of Sept. Is it okay to do it that way?

## 2018-08-04 ENCOUNTER — HOSPITAL ENCOUNTER (OUTPATIENT)
Dept: MRI IMAGING | Age: 83
Discharge: HOME | End: 2018-08-04
Attending: PSYCHIATRY & NEUROLOGY
Payer: MEDICARE

## 2018-08-04 DIAGNOSIS — E78.5 HYPERLIPIDEMIA, UNSPECIFIED HYPERLIPIDEMIA TYPE: ICD-10-CM

## 2018-08-04 DIAGNOSIS — G45.1 HEMISPHERIC CAROTID ARTERY SYNDROME: ICD-10-CM

## 2018-08-04 PROCEDURE — 70547 MR ANGIOGRAPHY NECK W/O DYE: CPT

## 2018-08-04 PROCEDURE — 70551 MRI BRAIN STEM W/O DYE: CPT

## 2018-08-04 PROCEDURE — 70544 MR ANGIOGRAPHY HEAD W/O DYE: CPT

## 2018-08-22 ENCOUNTER — OFFICE VISIT (OUTPATIENT)
Dept: NEUROLOGY | Age: 83
End: 2018-08-22

## 2018-08-22 VITALS
OXYGEN SATURATION: 95 % | SYSTOLIC BLOOD PRESSURE: 116 MMHG | HEART RATE: 56 BPM | WEIGHT: 141.09 LBS | BODY MASS INDEX: 24.22 KG/M2 | DIASTOLIC BLOOD PRESSURE: 72 MMHG

## 2018-08-22 DIAGNOSIS — E78.5 HYPERLIPIDEMIA LDL GOAL <70: ICD-10-CM

## 2018-08-22 DIAGNOSIS — R73.03 PREDIABETES: ICD-10-CM

## 2018-08-22 DIAGNOSIS — G45.1 HEMISPHERIC CAROTID ARTERY SYNDROME: Primary | ICD-10-CM

## 2018-08-22 DIAGNOSIS — G62.9 POLYNEUROPATHY: ICD-10-CM

## 2018-08-22 NOTE — PROGRESS NOTES
Chief Complaint   Patient presents with    Follow-up     MRI results      Visit Vitals    /72    Pulse (!) 56    Wt 64 kg (141 lb 1.5 oz)    SpO2 95%    BMI 24.22 kg/m2     PHQ over the last two weeks 8/22/2018   Little interest or pleasure in doing things Not at all   Feeling down, depressed, irritable, or hopeless Not at all   Total Score PHQ 2 0

## 2018-08-22 NOTE — MR AVS SNAPSHOT
[facility] [address] [place], [place] [phone] Patient: Linette Castro MRN: [id number] HXW:1/62/3820 Visit Information Date & Time Provider Department Dept. Phone Encounter #  
 8/22/2018 11:00 AM Vamsi Neri, [facility] at [address] [id number] Follow-up Instructions Return in about 6 months (around 2/22/2019). Your Appointments 1/21/2019  1:30 PM  
ESTABLISHED PATIENT with MD Haider Eduardo TURNER, [address] [place] [address] ([address]) Appt Note: follow up [address] [address]  
[phone]  
  
   
 [address] [facility] [address] [address] Upcoming Health Maintenance Date Due DTaP/Tdap/Td series (1 - Tdap) 1/22/1956 GLAUCOMA SCREENING Q2Y 7/13/2018 Influenza Age 5 to Adult 8/1/2018 MEDICARE YEARLY EXAM 6/13/2019 Allergies as of 8/22/2018  Review Complete On: 8/22/2018 By: Flaquito Waldrop LPN No Known Allergies Current Immunizations  Reviewed on 8/28/2017 Name Date Influenza High Dose Vaccine PF 10/9/2017 Pneumococcal Conjugate (PCV-13) 1/5/2018 ZZZ-RETIRED (DO NOT USE) Pneumococcal Vaccine (Unspecified Type) 8/29/2006 Zoster Recombinant 4/12/2018 Zoster Vaccine, Live 12/18/2012 Not reviewed this visit You Were Diagnosed With   
  
 Codes Comments Hemispheric carotid artery syndrome    -  Primary ICD-10-CM: G45.1 ICD-9-CM: 435.8 Hyperlipidemia LDL goal <70     ICD-10-CM: E78.5 ICD-9-CM: 272.4 Polyneuropathy     ICD-10-CM: G62.9 ICD-9-CM: 356.9 Prediabetes     ICD-10-CM: R73.03 
ICD-9-CM: 790.29 Vitals BP Pulse Weight(growth percentile) SpO2 BMI OB Status 116/72 (!) 56 141 lb 1.5 oz (64 kg) 95% 24.22 kg/m2 Postmenopausal  
 Smoking Status Former Smoker BMI and BSA Data Body Mass Index Body Surface Area 24.22 kg/m 2 1.7 m 2 Preferred Pharmacy Pharmacy Name Phone [facility] Con[facility], [facility] [phone] Your Updated Medication List  
  
   
This list is accurate as of 8/22/18 11:13 AM.  Always use your most recent med list.  
  
  
  
  
 clopidogrel 75 mg Tab Commonly known as:  PLAVIX Take 1 Tab by mouth daily. latanoprost 0.005 % ophthalmic solution Commonly known as:  Dougie Davila Administer 1 Drop to both eyes nightly. oxybutynin 5 mg tablet Commonly known as:  DITROPAN  
TAKE 1 TABLET THREE TIMES A DAY PARoxetine 10 mg tablet Commonly known as:  PAXIL Take 1 Tab by mouth daily. simvastatin 40 mg tablet Commonly known as:  ZOCOR  
TAKE 1 TABLET NIGHTLY  
  
 trimethoprim-sulfamethoxazole 160-800 mg per tablet Commonly known as:  BACTRIM DS, SEPTRA DS Take 1 Tab by mouth two (2) times a day. Follow-up Instructions Return in about 6 months (around 2/22/2019). Introducing \[facility]\"" & HEALTH SERVICES! Dear Ruby Guevara: Thank you for requesting a FreeAgent account. Our records indicate that you already have an active FreeAgent account. You can access your account anytime at [email or web address] Did you know that you can access your hospital and ER discharge instructions at any time in [employer]? You can also review all of your test results from your hospital stay or ER visit. Additional Information If you have questions, please visit the Frequently Asked Questions section of the [employer] website at [email or web address]/. Remember, [employer]Agent is NOT to be used for urgent needs. For medical emergencies, dial 911. Now available from your iPhone and Android! Please provide this summary of care documentation to your next provider. Your primary care clinician is listed as GETACHEW De Santiago.  If you have any questions after today's visit, please call [phone].

## 2018-08-22 NOTE — PROGRESS NOTES
Neurology Clinic Follow up Note    Patient ID:  Anil Aquino  [id number]  82 y.o.  1935      Ms. Sapphire Cordova is here for follow up today of aphasia, headache. Last Appointment With Me:  5/9/2018     HISTORY OF PRESENT ILLNESS:  Merary Jensen is a 82 y.o. right handed female presenting for evaluation of TIA/Stroke. Pt reports 1 week ago she awoke without sx. She started experiencing a severe occipital headache, slight nausea. She went to sleep and awoke later with persistent headache. Her friend who is a retired dentist noted that her speech was slightly irregular (fluent aphasia) that day. No noted focal weakness, paresthesias, dysarthria, vision deficits. Her headache was improved the following day and speech was back to baseline. She had been taking ASA daily up until her recent PCP visit. Now on Plavix monotherapy and doing well this. No recurrence of speech deficits. Of note, she has had a h/o TIAs in 2009, 2015 and 2017. Her prior events were all associated with fluent aphasia as well as severe headache. Neuroimaging has been negative during previous evaluations. Interval History:   No recurrence of aphasia or headaches since last visit. No focal weakness, paresthesias or vision loss reported. Compliant with Plavix daily- denies side effects/hemorrhage. MRI Brain did not reveal evidence of acute ischemia, MRA with patent vasculature. She did not complete extended cardiac monitoring. She reports TTE was completed although I do not have these results. PMHx/ PSHx/ FHx/ SHx:  Reviewed and unchanged previous visit.    Past Medical History:   Diagnosis Date    Arthritis     Cancer ([facility] [address]     squamous cell left leg    Gastroesophageal reflux disease without esophagitis 6/12/2018    Glaucoma 12/18/2013    Hyperlipidemia 8/29/2012    Postmenopausal HRT (hormone replacement therapy) 8/29/2012    PVC (premature ventricular contraction) 1/7/2015    TIA (transient ischemic attack) 5/1/2015    Uterovaginal prolapse 2/3/2012         ROS:  Comprehensive review of systems negative except for as noted above. Objective:       Meds:  Current Outpatient Prescriptions   Medication Sig Dispense Refill    simvastatin (ZOCOR) 40 mg tablet TAKE 1 TABLET NIGHTLY 90 Tab 4    clopidogrel (PLAVIX) 75 mg tab Take 1 Tab by mouth daily. 90 Tab 4    trimethoprim-sulfamethoxazole (BACTRIM DS, SEPTRA DS) 160-800 mg per tablet Take 1 Tab by mouth two (2) times a day. 8 Tab 1    oxybutynin (DITROPAN) 5 mg tablet TAKE 1 TABLET THREE TIMES A  Tab 2    PARoxetine (PAXIL) 10 mg tablet Take 1 Tab by mouth daily. 90 Tab 4    latanoprost (XALATAN) 0.005 % ophthalmic solution Administer 1 Drop to both eyes nightly. Exam:  Visit Vitals    /72    Pulse (!) 56    Wt 64 kg (141 lb 1.5 oz)    SpO2 95%    BMI 24.22 kg/m2     NEUROLOGICAL EXAM:  General: Awake, alert, speech fluent  CN: PERRL, EOMI without nystagmus, VFF to confrontation, facial sensation and strength are normal and symmetric, hearing is intact to finger rub bilaterally, palate and tongue movements are intact and symmetric. Motor: Normal tone, bulk and strength bilaterally. Reflexes: 2/4 and symmetric except 0/4 achilles b/l, plantar stimulation is flexor. Coordination: FNF, SHEELA, HTS intact. Sensation: Absent vibration to knees, decreased pinprick to mid calves, preserved proprioception/LT;   Gait: Normal-based and steady.       LABS  Results for orders placed or performed in visit on 11/16/39   METABOLIC PANEL, COMPREHENSIVE   Result Value Ref Range    Glucose 94 65 - 99 mg/dL    BUN 14 8 - 27 mg/dL    Creatinine 0.88 0.57 - 1.00 mg/dL    GFR est non-AA 62 >59 mL/min/1.73    GFR est AA 71 >59 mL/min/1.73    BUN/Creatinine ratio 16 12 - 28    Sodium 136 134 - 144 mmol/L    Potassium 4.0 3.5 - 5.2 mmol/L    Chloride 94 (L) 96 - 106 mmol/L    CO2 28 20 - 29 mmol/L    Calcium 9.4 8.7 - 10.3 mg/dL    Protein, total 6.7 6.0 - 8.5 g/dL    Albumin 4.6 3.5 - 4.7 g/dL    GLOBULIN, TOTAL 2.1 1.5 - 4.5 g/dL    A-G Ratio 2.2 1.2 - 2.2    Bilirubin, total 0.7 0.0 - 1.2 mg/dL    Alk. phosphatase 69 39 - 117 IU/L    AST (SGOT) 24 0 - 40 IU/L    ALT (SGPT) 14 0 - 32 IU/L   AMB POC URINALYSIS DIP STICK AUTO W/O MICRO   Result Value Ref Range    Color (UA POC) Yellow     Clarity (UA POC) Slightly Cloudy     Glucose (UA POC) Negative Negative    Bilirubin (UA POC) Negative Negative    Ketones (UA POC) Negative Negative    Specific gravity (UA POC) 1.015 1.001 - 1.035    Blood (UA POC) Trace Negative    pH (UA POC) 6.5 4.6 - 8.0    Protein (UA POC) Negative Negative    Urobilinogen (UA POC) normal 0.2 - 1    Nitrites (UA POC) Negative Negative    Leukocyte esterase (UA POC) 3+ Negative     Component      Latest Ref Rng & Units 7/20/2018 5/18/2018 5/18/2018 5/18/2018           1:37 PM  9:17 AM  9:17 AM  9:17 AM   Glucose      65 - 99 mg/dL 94      BUN      8 - 27 mg/dL 14      Creatinine      0.57 - 1.00 mg/dL 0.88      GFR est non-AA      >59 mL/min/1.73 62      GFR est AA      >59 mL/min/1.73 71      BUN/Creatinine ratio      12 - 28 16      Sodium      134 - 144 mmol/L 136      Potassium      3.5 - 5.2 mmol/L 4.0      Chloride      96 - 106 mmol/L 94 (L)      CO2      20 - 29 mmol/L 28      Calcium      8.7 - 10.3 mg/dL 9.4      Protein, total      6.0 - 8.5 g/dL 6.7      Albumin      3.5 - 4.7 g/dL 4.6      GLOBULIN, TOTAL      1.5 - 4.5 g/dL 2.1      A-G Ratio      1.2 - 2.2 2.2      Bilirubin, total      0.0 - 1.2 mg/dL 0.7      Alk.  phosphatase      39 - 117 IU/L 69      AST      0 - 40 IU/L 24      ALT (SGPT)      0 - 32 IU/L 14      Cholesterol, total      100 - 199 mg/dL    203 (H)   Triglyceride      0 - 149 mg/dL    64   HDL Cholesterol      >39 mg/dL    67   VLDL, calculated      5 - 40 mg/dL    13   LDL, calculated      0 - 99 mg/dL    123 (H)   Immunofixation, serum        Comment     Immunoglobulin G, Qt.      700 - 1600 mg/dL  630 (L) Immunoglobulin A, Qt.      64 - 422 mg/dL  164     Immunoglobulin M, Qt.      26 - 217 mg/dL  59     Hemoglobin A1c, (calculated)      4.8 - 5.6 %       Vitamin B12      232 - 1245 pg/mL   514      Component      Latest Ref Rng & Units 5/18/2018           9:17 AM   Glucose      65 - 99 mg/dL    BUN      8 - 27 mg/dL    Creatinine      0.57 - 1.00 mg/dL    GFR est non-AA      >59 mL/min/1.73    GFR est AA      >59 mL/min/1.73    BUN/Creatinine ratio      12 - 28    Sodium      134 - 144 mmol/L    Potassium      3.5 - 5.2 mmol/L    Chloride      96 - 106 mmol/L    CO2      20 - 29 mmol/L    Calcium      8.7 - 10.3 mg/dL    Protein, total      6.0 - 8.5 g/dL    Albumin      3.5 - 4.7 g/dL    GLOBULIN, TOTAL      1.5 - 4.5 g/dL    A-G Ratio      1.2 - 2.2    Bilirubin, total      0.0 - 1.2 mg/dL    Alk. phosphatase      39 - 117 IU/L    AST      0 - 40 IU/L    ALT (SGPT)      0 - 32 IU/L    Cholesterol, total      100 - 199 mg/dL    Triglyceride      0 - 149 mg/dL    HDL Cholesterol      >39 mg/dL    VLDL, calculated      5 - 40 mg/dL    LDL, calculated      0 - 99 mg/dL    Immunofixation, serum          Immunoglobulin G, Qt.      700 - 1600 mg/dL    Immunoglobulin A, Qt.      64 - 422 mg/dL    Immunoglobulin M, Qt.      26 - 217 mg/dL    Hemoglobin A1c, (calculated)      4.8 - 5.6 % 5.7 (H)   Vitamin B12      232 - 1245 pg/mL      IMAGING:  MRI Results (most recent):    Results from Hospital Encounter encounter on 08/04/18   MRA NECK WO CONT   Narrative INDICATION:   TIA, aphasia     COMPARISON:  CT February 7, 2017, prior MRI March 27, 2009    TECHNIQUE:  MR imaging of the brain was performed with sagittal T1, axial T1,  T2, FLAIR, GRE, DWI/ADC. 3-D time-of-flight MRA of the brain was performed. Multiplanar reconstructions were obtained. 2D time of flight MRA of the neck  was performed. Multiplanar reconstructions were obtained. FINDINGS:      Ventricles:  Midline, no hydrocephalus.     Brain Parenchyma/Brainstem: Moderate chronic white matter disease throughout  the supratentorial brain, progressed since the prior examination. No acute  infarction. Intracranial Hemorrhage:  None. Basal Cisterns:  Normal.   Flow Voids:  Normal.  Additional Comments:  N/A. MRA NECK    Carotid Arteries:  No significant stenosis by NASCET criteria. Vertebral Arteries:  Patent with no significant stenosis  Additional Comments:  N/A. MRA HEAD    Posterior Circulation:  No flow limiting stenosis or occlusion. Anterior Circulation:  No flow limiting stenosis or occlusion. Additional Comments:  No evidence of aneurysm or vascular malformation. Impression IMPRESSION:  1. Moderate chronic supratentorial white matter disease with no acute  infarction. 2.  No flow limiting stenosis or arterial occlusion. Assessment:     Encounter Diagnoses     ICD-10-CM ICD-9-CM   1. Hemispheric carotid artery syndrome G45.1 435.8   2. Hyperlipidemia LDL goal <70 E78.5 272.4   3. Polyneuropathy G62.9 356.9   4. Prediabetes R73.03 78.29     82year old RHF with a h/o HPL, TIAs, PVCs, glaucoma here for f/u of transient fluent aphasia lasting <24h with associated severe occipital headache which has presently resolved. Neurological examination is non-focal apart from mixed large/small fiber sensory deficits involving the distal lower extremities c/w a length-dependent generalized polyneuropathy. Her presentation is concerning for L hemispheric TIA. She has experienced similar presentation in the past (2009, 2015, 2017) also associated with transient fluent aphasia and headache. MRI Brain/MRA head and neck completed without evidence of acute ischemia or large vessel occlusion/stenosis, noted moderate chronic microvascular ischemic changes. While migraine variant with associated aphasia is a possibility, this would be a diagnosis of exclusion.   She was instructed to seek immediate medical attention with any acute focal neurologic deficits in the future. She appears to be doing well on Plavix monotherapy for stroke prevention. No recurrence of stroke symptoms since last visit. Regarding her incidental polyneuropathy noted on examination, this is most likely due to pre-DM noted on neuropathy labs. Plan:   · Cont. Plavix, statin therapy for stroke prevention  · Extended cardiac monitoring pending- she was instructed to f/u with cardiology and fax results once available along with prior TTE findings  · Goal SBP<140, LDL<70, HbA1C<7.0  · Strict glucose control to prevent progression of polyneuropathy    Follow-up Disposition:  Return in about 6 months (around 2/22/2019).     Signed:  Caleb Estrada DO  8/22/2018  10:57 AM

## 2019-01-31 ENCOUNTER — HOSPITAL ENCOUNTER (OUTPATIENT)
Dept: MRI IMAGING | Age: 84
Discharge: HOME | End: 2019-01-31
Payer: MEDICARE

## 2019-01-31 DIAGNOSIS — I69.398 CVA, OLD, HOMONYMOUS HEMIANOPSIA: ICD-10-CM

## 2019-01-31 DIAGNOSIS — H53.469 CVA, OLD, HOMONYMOUS HEMIANOPSIA: ICD-10-CM

## 2019-01-31 PROCEDURE — 70551 MRI BRAIN STEM W/O DYE: CPT

## 2019-02-06 ENCOUNTER — HOSPITAL ENCOUNTER (OUTPATIENT)
Dept: CT IMAGING | Age: 84
Discharge: HOME | End: 2019-02-06
Payer: MEDICARE

## 2019-02-06 DIAGNOSIS — G45.9 TIA (TRANSIENT ISCHEMIC ATTACK): ICD-10-CM

## 2019-02-06 PROCEDURE — 70450 CT HEAD/BRAIN W/O DYE: CPT

## 2019-02-19 ENCOUNTER — OFFICE VISIT (OUTPATIENT)
Dept: NEUROLOGY | Age: 84
End: 2019-02-19

## 2019-02-19 VITALS
BODY MASS INDEX: 24.07 KG/M2 | WEIGHT: 141 LBS | HEIGHT: 64 IN | HEART RATE: 73 BPM | OXYGEN SATURATION: 95 % | SYSTOLIC BLOOD PRESSURE: 120 MMHG | DIASTOLIC BLOOD PRESSURE: 78 MMHG | RESPIRATION RATE: 20 BRPM

## 2019-02-19 DIAGNOSIS — G45.1 HEMISPHERIC CAROTID ARTERY SYNDROME: ICD-10-CM

## 2019-02-19 DIAGNOSIS — R93.0 ABNORMAL MRI OF HEAD: Primary | ICD-10-CM

## 2019-02-19 DIAGNOSIS — R41.89 COGNITIVE IMPAIRMENT: ICD-10-CM

## 2019-02-19 NOTE — PROGRESS NOTES
Neurology Clinic Follow up Note    Patient ID:  Aparna Douglas  [id number]  82 y.o.  1935      Ms. Stephanie Lieberman is here for follow up today of aphasia, headache. Last Appointment With Me:  8/22/2018     HISTORY OF PRESENT ILLNESS:  Boaz Spangler is a 82 y.o. right handed female presenting for evaluation of TIA/Stroke. She started experiencing a severe occipital headache, slight nausea. She went to sleep and awoke later with persistent headache. Her friend who is a retired dentist noted that her speech was slightly irregular (fluent aphasia) that day. No noted focal weakness, paresthesias, dysarthria, vision deficits. Her headache was improved the following day and speech was back to baseline. She had been taking ASA daily up until her recent PCP visit. Now on Plavix monotherapy and doing well this. Of note, she has had a h/o TIAs in 2009, 2015 and 2017. Her prior events were all associated with fluent aphasia as well as severe headache. Neuroimaging has been negative during previous evaluations. Interval History:   No recurrence of aphasia or headaches since last visit. She did report some worsening vision b/l- she cannot expand upon this further when questioned. Upon review of EMR, MRI Brain WO was repeated 1/2019 by her PCP due to reported R-HH noted on ophthalmologic evaluation. Non contrasted study showed evidence of FLAIR hyperintensity L fronto-parietal region. Subsequent CT head performed to exclude SDH and negative for hemorrhage. She denies focal weakness, paresthesias, focal vision or speech deficits at today's visit. She admits to some worsening memory impairment. She is residing alone. She is administering her own medications and handling finances independently. She is driving without reported difficulties with navigation or MVAs. Compliant with Plavix daily for stroke prevention. PMHx/ PSHx/ FHx/ SHx:  Reviewed and unchanged previous visit.    Past Medical History:   Diagnosis Date    Arthritis     Cancer [facility])     squamous cell left leg    Gastroesophageal reflux disease without esophagitis 6/12/2018    Glaucoma 12/18/2013    Hyperlipidemia 8/29/2012    Postmenopausal HRT (hormone replacement therapy) 8/29/2012    PVC (premature ventricular contraction) 1/7/2015    TIA (transient ischemic attack) 5/1/2015    Uterovaginal prolapse 2/3/2012     Past Surgical History:   Procedure Laterality Date    HX BREAST BIOPSY      CARMEL.  HX GI      COLONOSCOPY    HX HYSTERECTOMY      X 4    HX ORTHOPAEDIC      RT. BUNIONECTOMY    HX ORTHOPAEDIC      CALL'S NEUROMA    HX TONSILLECTOMY       Family History   Problem Relation Age of Onset    Heart Attack Mother 80         ROS:  Comprehensive review of systems negative except for as noted above. Objective:       Meds:  Current Outpatient Medications   Medication Sig Dispense Refill    clopidogrel (PLAVIX) 75 mg tab Take 1 Tab by mouth daily. 90 Tab 4    PARoxetine (PAXIL) 10 mg tablet TAKE 1 TABLET DAILY 90 Tab 4    simvastatin (ZOCOR) 40 mg tablet TAKE 1 TABLET NIGHTLY 90 Tab 4    oxybutynin (DITROPAN) 5 mg tablet TAKE 1 TABLET THREE TIMES A  Tab 2    latanoprost (XALATAN) 0.005 % ophthalmic solution Administer 1 Drop to both eyes nightly. Exam:  Visit Vitals  /78   Pulse 73   Resp 20   Ht 5' 4\" (1.626 m)   Wt 64 kg (141 lb)   SpO2 95%   BMI 24.20 kg/m²   RRR  CTAB  Abd NT/ND  Ext non edematous  NEUROLOGICAL EXAM:  General: Awake, alert, speech fluent. [address] [place] [address] 14/30 (see scanned media). CN: PERRL, EOMI without nystagmus, VFF to confrontation, facial sensation and strength are normal and symmetric, hearing is intact to finger rub bilaterally, palate and tongue movements are intact and symmetric. Motor: Normal tone, bulk and strength bilaterally. Reflexes: 2/4 and symmetric except 0/4 achilles b/l, plantar stimulation is flexor. Coordination: FNF, SHEELA, HTS intact.   Sensation: Absent vibration to knees, decreased pinprick to mid calves, preserved proprioception/LT;   Gait: Normal-based and steady. LABS  Results for orders placed or performed in visit on 27/50/18   METABOLIC PANEL, COMPREHENSIVE   Result Value Ref Range    Glucose 94 65 - 99 mg/dL    BUN 14 8 - 27 mg/dL    Creatinine 0.88 0.57 - 1.00 mg/dL    GFR est non-AA 62 >59 mL/min/1.73    GFR est AA 71 >59 mL/min/1.73    BUN/Creatinine ratio 16 12 - 28    Sodium 136 134 - 144 mmol/L    Potassium 4.0 3.5 - 5.2 mmol/L    Chloride 94 (L) 96 - 106 mmol/L    CO2 28 20 - 29 mmol/L    Calcium 9.4 8.7 - 10.3 mg/dL    Protein, total 6.7 6.0 - 8.5 g/dL    Albumin 4.6 3.5 - 4.7 g/dL    GLOBULIN, TOTAL 2.1 1.5 - 4.5 g/dL    A-G Ratio 2.2 1.2 - 2.2    Bilirubin, total 0.7 0.0 - 1.2 mg/dL    Alk. phosphatase 69 39 - 117 IU/L    AST (SGOT) 24 0 - 40 IU/L    ALT (SGPT) 14 0 - 32 IU/L   AMB POC URINALYSIS DIP STICK AUTO W/O MICRO   Result Value Ref Range    Color (UA POC) Yellow     Clarity (UA POC) Slightly Cloudy     Glucose (UA POC) Negative Negative    Bilirubin (UA POC) Negative Negative    Ketones (UA POC) Negative Negative    Specific gravity (UA POC) 1.015 1.001 - 1.035    Blood (UA POC) Trace Negative    pH (UA POC) 6.5 4.6 - 8.0    Protein (UA POC) Negative Negative    Urobilinogen (UA POC) normal 0.2 - 1    Nitrites (UA POC) Negative Negative    Leukocyte esterase (UA POC) 3+ Negative       IMAGING:  MRI Results (most recent):  Results from Hospital Encounter encounter on 01/31/19   MRI BRAIN WO CONT    Narrative EXAM:  MRI BRAIN WO CONT    INDICATION:    Stroke; right homonymous hemianopsia    COMPARISON:  MRI brain 8/4/2018. CONTRAST: None. TECHNIQUE:    Multiplanar multisequence acquisition without contrast of the brain. FINDINGS:  There is new sulcal FLAIR hyperintensity seen within the left anterior frontal  lobe, with associated gradient hypointensity.  There is similarly sulcal FLAIR  hyperintensity seen in the left superior parietal lobe associated gradient  hypointensity (series 701 image 20). The ventricles are normal in size and position. . Ventricular deep white matter  T2/FLAIR hyperintensities, confluent in regions, consistent with moderate  chronic microvascular ischemic disease and not significantly changed since prior  examination. No evidence of acute infarct or mass effect. There is no cerebellar  tonsillar herniation. Expected arterial flow-voids are present. The paranasal sinuses are clear. Small left mastoid effusion. The orbital  contents are within normal limits. No significant osseous or scalp lesions are  identified. Impression IMPRESSION:   1. New sulcal FLAIR hyperintensity seen within the left anterior frontal lobe  with associated gradient hypointensity, with similar findings within the left  superior parietal lobe. The findings are concerning for small volume  subarachnoid hemorrhage. CT head is recommended for further evaluation. If no  hemorrhage is seen on CT, additional differential considerations would include  meningitis, an inflammatory meningeal process, or leptomeningeal metastases (if  the patient has a history of cancer), and would require contrast enhanced brain  MRI for further evaluation. 2. Unchanged moderate chronic microvascular ischemic disease. No acute infarct. The findings were called to Dr. Hunter Christopher on 1/31/2019 at 3:58 PM by Dr. Buenrostro Body. [id number]     CT Results (most recent):  Results from Hospital Encounter encounter on 02/06/19   CT HEAD WO CONT    Narrative EXAM: CT HEAD WO CONT    INDICATION: right homonymous hemianopsia    COMPARISON: 1/31/2019 MRI. CONTRAST: None. TECHNIQUE: Unenhanced CT of the head was performed using 5 mm images. Brain and  bone windows were generated. CT dose reduction was achieved through use of a  standardized protocol tailored for this examination and automatic exposure  control for dose modulation.       FINDINGS:  The ventricles and sulci are normal in size, shape and configuration and  midline. Unchanged periventricular hypodensities are noted. No definite sulcal  hyperdensity is seen on today's exam, including the areas of concern described  previously in the left frontal and left parietal lobes. The basilar cisterns are  open. The bone windows demonstrate no abnormalities. The visualized portions of the  paranasal sinuses and mastoid air cells are clear. Impression IMPRESSION:   No acute intracranial abnormality identified on the current exam. This includes  no hyperdensity identified on on this exam to suggest a subarachnoid hemorrhage. However, a small amount of subarachnoid hemorrhage could have resolved within  the last 7 days since the prior MRI. Recommend follow-up MRI of the brain with  and without contrast, as well as MRA to evaluate for possible aneurysm. 23X                 Assessment:     Encounter Diagnoses     ICD-10-CM ICD-9-CM   1. Abnormal MRI of head R93.0 793.0   2. Cognitive impairment R41.89 294.9   3. Hemispheric carotid artery syndrome G45.1 37.8     82year old RHF with a h/o HPL, TIAs, PVCs, glaucoma, polyneuropathy here for f/u. Previously seen for transient fluent aphasia lasting <24h with associated severe occipital headache which has presently resolved on Plavix monotherapy. Her presentation is concerning for L hemispheric TIA. She has experienced similar presentation in the past (2009, 2015, 2017) also associated with transient fluent aphasia and headache. MRI Brain/MRA head and neck previously completed without evidence of acute ischemia or large vessel occlusion/stenosis, noted moderate chronic microvascular ischemic changes. While migraine variant with associated aphasia is a possibility, this would be a diagnosis of exclusion. She was instructed to seek immediate medical attention with any acute focal neurologic deficits in the future.   She appears to be doing well on Plavix monotherapy for stroke prevention. No recurrence of stroke symptoms since last visit. Since last visit, she was noted with [facility] on ophthalmologic evaluation (not appreciable on gross examination of visual fields today) prompting repeat MRI Brain WO 1/2019 showing non-specific L fronto-parietal cortical FLAIR hyperintensity concerning for possible SAH. Subsequent CT head performed and negative for hemorrhage, however this was not completed until 1 week after initial imaging. Infectious or neoplastic etiologies are considered less likely on the basis of her clinical presentation. Will obtain repeat MRI with contrast for further evaluation as well as MRA to exclude any vascular malformation which may have contributed to potential hemorrhage. Will continue current antiplatelet regimen given her h/o recurrent TIAs and stroke risk. There are no focal deficits on examination today, however I do appreciate a degree of cognitive impairment which was not as apparent on prior interviews. Subsequent MOCA testing revealed evidence of a moderate dementia (14/30) with impaired delayed recall, executive dysfunction, inattention and relatively preserved orientation. We discussed findings in detail, and patient agreed that she has noted a decline in memory for some time now. Advise that she obtain assistance in regards to medication administration and financial dealings. Executive decisions should be handled by her POA (son). Will obtain a formal driving assessment to determine safety for independent driving moving forward. Plan:   · MRI Brain WWO to reassess FLAIR hyperintensity L fronto-parietal region seen on recent non contrasted study. Will obtain MRA head as well to assess for any evidence of vascular malformation/aneurysm. · Cont.  Plavix, statin therapy for stroke prevention  · Goal SBP<140, LDL<70, HbA1C<7.0  · Discussed obtaining assistance with medications and finances due to documented moderate dementia apparent on examination today. Driving evaluation also ordered. She was instructed to bring her son/POA to next appointment to discuss her cognitive decline. Follow-up Disposition:  Return in about 6 weeks (around 4/2/2019).     Signed:  Mendy Wahsington DO  2/19/2019  10:57 AM

## 2019-02-19 NOTE — PROGRESS NOTES
Patient is here for a follow up from a TIA. She states that she feels well and has been doing really well since her last visit. She does not have any complaints or concerns at this time.

## 2019-02-20 ENCOUNTER — DOCUMENTATION ONLY (OUTPATIENT)
Dept: NEUROLOGY | Age: 84
End: 2019-02-20

## 2019-02-25 ENCOUNTER — HOSPITAL ENCOUNTER (OUTPATIENT)
Dept: MRI IMAGING | Age: 84
Discharge: HOME | End: 2019-02-25
Payer: MEDICARE

## 2019-02-25 DIAGNOSIS — G45.1 HEMISPHERIC CAROTID ARTERY SYNDROME: ICD-10-CM

## 2019-02-25 DIAGNOSIS — R41.89 COGNITIVE IMPAIRMENT: ICD-10-CM

## 2019-02-25 DIAGNOSIS — R93.0 ABNORMAL MRI OF HEAD: ICD-10-CM

## 2019-02-25 PROCEDURE — 70544 MR ANGIOGRAPHY HEAD W/O DYE: CPT

## 2019-02-25 PROCEDURE — A9575 INJ GADOTERATE MEGLUMI 0.1ML: HCPCS | Performed by: RADIOLOGY

## 2019-02-25 PROCEDURE — 70553 MRI BRAIN STEM W/O & W/DYE: CPT

## 2019-02-25 RX ORDER — SODIUM CHLORIDE 0.9 % (FLUSH) 0.9 %
10 SYRINGE (ML) INJECTION
Status: COMPLETED | OUTPATIENT
Start: 2019-02-25 | End: 2019-02-25

## 2019-02-25 RX ADMIN — Medication 10 ML: at 11:43

## 2019-02-26 NOTE — PROGRESS NOTES
Noted results of recent imaging showing possible small focus of cortical infarct L posterior frontal lobe, resolution of L anterior frontal hemorrhage/possible SAH. No evidence of vascular malformation/aneurysm or significant stenosis on MRA. Suspect ischemic stroke, possible minimal hemorrhagic conversion. Will continue current antiplatelet regimen for stroke prevention. She remains moderate to high risk for future stroke.       Amaury Whitt,   02/26/19

## 2019-04-09 ENCOUNTER — OFFICE VISIT (OUTPATIENT)
Dept: NEUROLOGY | Age: 84
End: 2019-04-09

## 2019-04-09 ENCOUNTER — DOCUMENTATION ONLY (OUTPATIENT)
Dept: NEUROLOGY | Age: 84
End: 2019-04-09

## 2019-04-09 VITALS
BODY MASS INDEX: 23.9 KG/M2 | DIASTOLIC BLOOD PRESSURE: 60 MMHG | RESPIRATION RATE: 18 BRPM | HEIGHT: 64 IN | SYSTOLIC BLOOD PRESSURE: 126 MMHG | HEART RATE: 70 BPM | WEIGHT: 140 LBS

## 2019-04-09 DIAGNOSIS — E78.5 HYPERLIPIDEMIA LDL GOAL <70: ICD-10-CM

## 2019-04-09 DIAGNOSIS — G30.9 MIXED ALZHEIMER'S AND VASCULAR DEMENTIA (HCC): ICD-10-CM

## 2019-04-09 DIAGNOSIS — I63.9 CEREBROVASCULAR ACCIDENT (CVA), UNSPECIFIED MECHANISM (HCC): Primary | ICD-10-CM

## 2019-04-09 DIAGNOSIS — F02.80 MIXED ALZHEIMER'S AND VASCULAR DEMENTIA (HCC): ICD-10-CM

## 2019-04-09 DIAGNOSIS — F01.50 MIXED ALZHEIMER'S AND VASCULAR DEMENTIA (HCC): ICD-10-CM

## 2019-04-09 RX ORDER — DONEPEZIL HYDROCHLORIDE 10 MG/1
10 TABLET, FILM COATED ORAL
Qty: 90 TAB | Refills: 1 | Status: SHIPPED | OUTPATIENT
Start: 2019-04-09 | End: 2021-01-06

## 2019-04-09 NOTE — PROGRESS NOTES
Neurology Clinic Follow up Note    Patient ID:  Daria Quintero  [id number]  82 y.o.  1935      Ms. Sharonda Wu is here for follow up today of aphasia, headache. Last Appointment With Me:  2/19/19    HISTORY OF PRESENT ILLNESS:  Juarez Ignacio is a 82 y.o. right handed female presenting for evaluation of TIA/Stroke. She started experiencing a severe occipital headache, slight nausea. She went to sleep and awoke later with persistent headache. Her friend who is a retired dentist noted that her speech was slightly irregular (fluent aphasia) that day. No noted focal weakness, paresthesias, dysarthria, vision deficits. Her headache was improved the following day and speech was back to baseline. She had been taking ASA daily up until her recent PCP visit. Now on Plavix monotherapy and doing well this. Of note, she has had a h/o TIAs in 2009, 2015 and 2017. Her prior events were all associated with fluent aphasia as well as severe headache. Neuroimaging has been negative during previous evaluations. Interval History:   She is accompanied by her significant other and 2 sons today. She denies new focal weakness, paresthesias, focal vision or speech deficits at today's visit. Compliant with Plavix daily for stroke prevention. Family admits to some worsening memory impairment. She is residing alone. Sons are assisting with finances. Medications are currently managed by the patient. She is using a pill box but admits to forgetting medications intermittently. POA is already established. Driving assessment is still pending. She is driving without reported difficulties with navigation or MVAs. Tolerating Aricept without side effects. PMHx/ PSHx/ FHx/ SHx:  Reviewed and unchanged previous visit.    Past Medical History:   Diagnosis Date    Arthritis     Cancer ([facility] [address]     squamous cell left leg    Gastroesophageal reflux disease without esophagitis 6/12/2018    Glaucoma 12/18/2013    Hyperlipidemia 8/29/2012    Postmenopausal HRT (hormone replacement therapy) 8/29/2012    PVC (premature ventricular contraction) 1/7/2015    TIA (transient ischemic attack) 5/1/2015    Uterovaginal prolapse 2/3/2012     Past Surgical History:   Procedure Laterality Date    HX BREAST BIOPSY      CARMEL.  HX GI      COLONOSCOPY    HX HYSTERECTOMY      X 4    HX ORTHOPAEDIC      RT. BUNIONECTOMY    HX ORTHOPAEDIC      CALL'S NEUROMA    HX TONSILLECTOMY       Family History   Problem Relation Age of Onset    Heart Attack Mother 80         ROS:  Comprehensive review of systems negative except for as noted above. Objective:       Meds:  Current Outpatient Medications   Medication Sig Dispense Refill    oxybutynin (DITROPAN) 5 mg tablet TAKE 1 TABLET THREE TIMES A  Tab 2    donepezil (ARICEPT) 5 mg tablet Take 1 Tab by mouth nightly. 30 Tab 1    clobetasol 0.05 % sham 118 mL by Apply Externally route daily. 118 mL 12    clopidogrel (PLAVIX) 75 mg tab Take 1 Tab by mouth daily. 90 Tab 4    PARoxetine (PAXIL) 10 mg tablet TAKE 1 TABLET DAILY 90 Tab 4    simvastatin (ZOCOR) 40 mg tablet TAKE 1 TABLET NIGHTLY 90 Tab 4    latanoprost (XALATAN) 0.005 % ophthalmic solution Administer 1 Drop to both eyes nightly. Exam:  Visit Vitals  /60   Pulse 70   Resp 18   Ht 5' 4\" (1.626 m)   Wt 63.5 kg (140 lb)   BMI 24.03 kg/m²   NEUROLOGICAL EXAM:  General: Awake, alert, speech fluent. CN: PERRL, EOMI without nystagmus, VFF to confrontation, facial sensation and strength are normal and symmetric, hearing is intact to finger rub bilaterally, palate and tongue movements are intact and symmetric. Motor: Normal tone, bulk and strength bilaterally. Reflexes: 2/4 and symmetric except 0/4 achilles b/l, plantar stimulation is flexor. Coordination: FNF, SHEELA, HTS intact.   Sensation: Absent vibration to knees, decreased pinprick to mid calves, preserved proprioception/LT;   Gait: Normal-based and steady. LABS  Results for orders placed or performed in visit on 81/35/15   METABOLIC PANEL, COMPREHENSIVE   Result Value Ref Range    Glucose 94 65 - 99 mg/dL    BUN 14 8 - 27 mg/dL    Creatinine 0.88 0.57 - 1.00 mg/dL    GFR est non-AA 62 >59 mL/min/1.73    GFR est AA 71 >59 mL/min/1.73    BUN/Creatinine ratio 16 12 - 28    Sodium 136 134 - 144 mmol/L    Potassium 4.0 3.5 - 5.2 mmol/L    Chloride 94 (L) 96 - 106 mmol/L    CO2 28 20 - 29 mmol/L    Calcium 9.4 8.7 - 10.3 mg/dL    Protein, total 6.7 6.0 - 8.5 g/dL    Albumin 4.6 3.5 - 4.7 g/dL    GLOBULIN, TOTAL 2.1 1.5 - 4.5 g/dL    A-G Ratio 2.2 1.2 - 2.2    Bilirubin, total 0.7 0.0 - 1.2 mg/dL    Alk. phosphatase 69 39 - 117 IU/L    AST (SGOT) 24 0 - 40 IU/L    ALT (SGPT) 14 0 - 32 IU/L   AMB POC URINALYSIS DIP STICK AUTO W/O MICRO   Result Value Ref Range    Color (UA POC) Yellow     Clarity (UA POC) Slightly Cloudy     Glucose (UA POC) Negative Negative    Bilirubin (UA POC) Negative Negative    Ketones (UA POC) Negative Negative    Specific gravity (UA POC) 1.015 1.001 - 1.035    Blood (UA POC) Trace Negative    pH (UA POC) 6.5 4.6 - 8.0    Protein (UA POC) Negative Negative    Urobilinogen (UA POC) normal 0.2 - 1    Nitrites (UA POC) Negative Negative    Leukocyte esterase (UA POC) 3+ Negative       IMAGING:  MRI Results (most recent):  Results from Hospital Encounter encounter on 02/25/19   MRA BRAIN WO CONT    Narrative EXAM:  MRA BRAIN WO CONT  INDICATION:  eval for vascular malformation/aneurysm, TIA/CVA? , history of event  with severe occipital headache and nausea with subsequent persistent headache  and a friend noticed slightly irregular cyst speech. TECHNIQUE: Axial 3-D time-of-flight MR angiography was performed from the  cranial base to the proximal intracranial vessels. MIP reconstructions were  created. COMPARISON: MRI head  FINDINGS:  The distal internal carotid arteries are symmetric and without stenosis.  Tiny  left ICA 1-2 mm infundibulum. There is symmetric flow in the middle cerebral  arteries. The left anterior cerebral artery is dominant. The right vertebral artery is dominant. Both distal vertebral arteries are  well-maintained and the basilar artery is normal and supplies both posterior  cerebral arteries. Impression IMPRESSION: Normal MRA of the head. CT Results (most recent):  Results from Hospital Encounter encounter on 02/06/19   CT HEAD WO CONT    Narrative EXAM: CT HEAD WO CONT    INDICATION: right homonymous hemianopsia    COMPARISON: 1/31/2019 MRI. CONTRAST: None. TECHNIQUE: Unenhanced CT of the head was performed using 5 mm images. Brain and  bone windows were generated. CT dose reduction was achieved through use of a  standardized protocol tailored for this examination and automatic exposure  control for dose modulation. FINDINGS:  The ventricles and sulci are normal in size, shape and configuration and  midline. Unchanged periventricular hypodensities are noted. No definite sulcal  hyperdensity is seen on today's exam, including the areas of concern described  previously in the left frontal and left parietal lobes. The basilar cisterns are  open. The bone windows demonstrate no abnormalities. The visualized portions of the  paranasal sinuses and mastoid air cells are clear. Impression IMPRESSION:   No acute intracranial abnormality identified on the current exam. This includes  no hyperdensity identified on on this exam to suggest a subarachnoid hemorrhage. However, a small amount of subarachnoid hemorrhage could have resolved within  the last 7 days since the prior MRI. Recommend follow-up MRI of the brain with  and without contrast, as well as MRA to evaluate for possible aneurysm. 23X                 Assessment:     Encounter Diagnoses     ICD-10-CM ICD-9-CM   1. [facility] I63.9 434.91   2.  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 Hyperlipidemia LDL goal <70 E78.5 32.4     82year old RHF with a h/o HPL, TIAs, PVCs, glaucoma, polyneuropathy here for f/u. Previously seen for transient recurrent fluent aphasia/TIA. She has experienced similar presentation in the past (2009, 2015, 2017) also associated with transient fluent aphasia and headache. MRI Brain/MRA head and neck previously completed without evidence of acute ischemia or large vessel occlusion/stenosis, noted moderate chronic microvascular ischemic changes. While migraine variant with associated aphasia is a possibility, this would be a diagnosis of exclusion. Since last visit, she was noted with [facility] on ophthalmologic evaluation (not appreciable on gross examination of visual fields) prompting repeat MRI Brain WO 1/2019 showing non-specific L fronto-parietal cortical FLAIR hyperintensity concerning for possible SAH. Subsequent CT head performed and negative for hemorrhage, however this was not completed until 1 week after initial imaging. MRI W contrast/MRA Brain subsequently completed showing possible small focus of cortical infarct L posterior frontal lobe, resolution of L anterior frontal hemorrhage/possible SAH.  No evidence of vascular malformation/aneurysm or significant stenosis on MRA.  Suspect ischemic stroke, possible minimal hemorrhagic conversion.  Will continue current antiplatelet regimen for stroke prevention.  She remains moderate to high risk for future stroke. Will continue current antiplatelet regimen given her h/o recurrent TIAs and stroke risk. Defer dual antiplatelet therapy at this time due to increased bleeding risk. Will refer to cardiology to exclude any possibility of embolic source. Regarding her cognitive decline, previous MOCA testing revealed evidence of a moderate dementia (14/30) with impaired delayed recall, executive dysfunction, inattention and relatively preserved orientation.   We discussed findings in detail, and patient agreed that she has noted a decline in memory for some time now. Advise that she obtain assistance in regards to medication administration and financial dealings. Executive decisions should be handled by her POA (son). Will obtain a formal driving assessment to determine safety for independent driving moving forward. Plan:     · Cont. Plavix, statin therapy for stroke prevention  · Goal SBP<140, LDL<70, HbA1C<7.0  · Obtain CUS  · Cardiology referral to evaluate for central source of stroke  · Increase Aricept to 10mg/daily for dementia  · Discussed obtaining assistance with medications and finances due to documented moderate dementia apparent on examination today. Driving evaluation also ordered and pending. Follow-up and Dispositions    · Return in about 6 months (around 10/9/2019).          Signed:  Christiano Mares DO  4/9/2019

## 2019-04-09 NOTE — PATIENT INSTRUCTIONS
A Healthy Lifestyle: Care Instructions  Your Care Instructions    A healthy lifestyle can help you feel good, stay at a healthy weight, and have plenty of energy for both work and play. A healthy lifestyle is something you can share with your whole family. A healthy lifestyle also can lower your risk for serious health problems, such as high blood pressure, heart disease, and diabetes. You can follow a few steps listed below to improve your health and the health of your family. Follow-up care is a key part of your treatment and safety. Be sure to make and go to all appointments, and call your doctor if you are having problems. It's also a good idea to know your test results and keep a list of the medicines you take. How can you care for yourself at home? · Do not eat too much sugar, fat, or fast foods. You can still have dessert and treats now and then. The goal is moderation. · Start small to improve your eating habits. Pay attention to portion sizes, drink less juice and soda pop, and eat more fruits and vegetables. ? Eat a healthy amount of food. A 3-ounce serving of meat, for example, is about the size of a deck of cards. Fill the rest of your plate with vegetables and whole grains. ? Limit the amount of soda and sports drinks you have every day. Drink more water when you are thirsty. ? Eat at least 5 servings of fruits and vegetables every day. It may seem like a lot, but it is not hard to reach this goal. A serving or helping is 1 piece of fruit, 1 cup of vegetables, or 2 cups of leafy, raw vegetables. Have an apple or some carrot sticks as an afternoon snack instead of a candy bar. Try to have fruits and/or vegetables at every meal.  · Make exercise part of your daily routine. You may want to start with simple activities, such as walking, bicycling, or slow swimming. Try to be active 30 to 60 minutes every day. You do not need to do all 30 to 60 minutes all at once.  For example, you can exercise 3 times a day for 10 or 20 minutes. Moderate exercise is safe for most people, but it is always a good idea to talk to your doctor before starting an exercise program.  · Keep moving. [place] Bun the lawn, work in the garden, or Ziplocal. Take the stairs instead of the elevator at work. · If you smoke, quit. People who smoke have an increased risk for heart attack, stroke, cancer, and other lung illnesses. Quitting is hard, but there are ways to boost your chance of quitting tobacco for good. ? Use nicotine gum, patches, or lozenges. ? Ask your doctor about stop-smoking programs and medicines. ? Keep trying. In addition to reducing your risk of diseases in the future, you will notice some benefits soon after you stop using tobacco. If you have shortness of breath or asthma symptoms, they will likely get better within a few weeks after you quit. · Limit how much alcohol you drink. Moderate amounts of alcohol (up to 2 drinks a day for men, 1 drink a day for women) are okay. But drinking too much can lead to liver problems, high blood pressure, and other health problems. Family health  If you have a family, there are many things you can do together to improve your health. · Eat meals together as a family as often as possible. · Eat healthy foods. This includes fruits, vegetables, lean meats and dairy, and whole grains. · Include your family in your fitness plan. Most people think of activities such as jogging or tennis as the way to fitness, but there are many ways you and your family can be more active. Anything that makes you breathe hard and gets your heart pumping is exercise. Here are some tips:  ? Walk to do errands or to take your child to school or the bus.  ? Go for a family bike ride after dinner instead of watching TV. Where can you learn more? Go to http://castro-shiva.info/. Enter S621 in the search box to learn more about \"A Healthy Lifestyle: Care Instructions. \"  Current as of: September 11, 2018  Content Version: 11.9  © 3365-1903 [employer]. Care instructions adapted under license by [employer] (which disclaims liability or warranty for this information). If you have questions about a medical condition or this instruction, always ask your healthcare professional. [address] any warranty or liability for your use of this information.

## 2019-04-11 ENCOUNTER — DOCUMENTATION ONLY (OUTPATIENT)
Dept: NEUROLOGY | Age: 84
End: 2019-04-11

## 2019-04-11 NOTE — PROGRESS NOTES
Referral for occupational therapy for driving evaluation faxed to [address] [place] [address].

## 2019-04-16 ENCOUNTER — HOSPITAL ENCOUNTER (OUTPATIENT)
Dept: VASCULAR SURGERY | Age: 84
Discharge: HOME | End: 2019-04-16
Attending: PSYCHIATRY & NEUROLOGY
Payer: MEDICARE

## 2019-04-16 DIAGNOSIS — I63.9 CEREBROVASCULAR ACCIDENT (CVA), UNSPECIFIED MECHANISM (HCC): ICD-10-CM

## 2019-04-16 PROCEDURE — 93880 EXTRACRANIAL BILAT STUDY: CPT

## 2019-04-17 LAB
LEFT CCA DIST DIAS: 11.7 CM/S
LEFT CCA DIST SYS: 55.2 CM/S
LEFT CCA PROX DIAS: 10.7 CM/S
LEFT CCA PROX SYS: 67.3 CM/S
LEFT ECA DIAS: 4.53 CM/S
LEFT ECA SYS: 58.6 CM/S
LEFT ICA DIST DIAS: 22.3 CM/S
LEFT ICA DIST SYS: 71 CM/S
LEFT ICA MID DIAS: 17.3 CM/S
LEFT ICA MID SYS: 55.9 CM/S
LEFT ICA PROX DIAS: 11.5 CM/S
LEFT ICA PROX SYS: 50.8 CM/S
LEFT ICA/CCA SYS: 1.29
LEFT VERTEBRAL DIAS: 11.79 CM/S
LEFT VERTEBRAL SYS: 52 CM/S
RIGHT CCA DIST DIAS: 11.8 CM/S
RIGHT CCA DIST SYS: 59.8 CM/S
RIGHT CCA PROX DIAS: 10.6 CM/S
RIGHT CCA PROX SYS: 63.5 CM/S
RIGHT ECA DIAS: 6.53 CM/S
RIGHT ECA SYS: 53.2 CM/S
RIGHT ICA DIST DIAS: 13 CM/S
RIGHT ICA DIST SYS: 53.5 CM/S
RIGHT ICA MID DIAS: 9.8 CM/S
RIGHT ICA MID SYS: 38.7 CM/S
RIGHT ICA PROX DIAS: 8.3 CM/S
RIGHT ICA PROX SYS: 41 CM/S
RIGHT ICA/CCA SYS: 0.9
RIGHT VERTEBRAL DIAS: 18.3 CM/S
RIGHT VERTEBRAL SYS: 57.7 CM/S

## 2019-05-07 ENCOUNTER — OFFICE VISIT (OUTPATIENT)
Dept: CARDIOLOGY CLINIC | Age: 84
End: 2019-05-07

## 2019-05-07 VITALS
HEIGHT: 64 IN | WEIGHT: 133 LBS | DIASTOLIC BLOOD PRESSURE: 70 MMHG | BODY MASS INDEX: 22.71 KG/M2 | HEART RATE: 75 BPM | SYSTOLIC BLOOD PRESSURE: 116 MMHG

## 2019-05-07 DIAGNOSIS — G45.9 TIA (TRANSIENT ISCHEMIC ATTACK): Primary | ICD-10-CM

## 2019-05-07 DIAGNOSIS — I49.3 PVC (PREMATURE VENTRICULAR CONTRACTION): ICD-10-CM

## 2019-05-07 DIAGNOSIS — Z86.73 HISTORY OF CVA (CEREBROVASCULAR ACCIDENT): ICD-10-CM

## 2019-05-07 RX ORDER — PAROXETINE 10 MG/1
TABLET, FILM COATED ORAL
COMMUNITY
Start: 2019-02-25 | End: 2020-02-11

## 2019-05-07 NOTE — PATIENT INSTRUCTIONS
Your Implantable loop recorder procedure has been scheduled for 6/12/19 at 2:30 pm, at [facility].    Please report to Admitting Department by 1:00 pm, or 2 hours prior to your scheduled procedure. Please bring a list of your current medications and medication bottles, if able, to the hospital on this day. You will be unable to drive after your procedure so please make sure to bring someone with you to your procedure. You will need to have nothing to eat or drink after midnight, the night prior to your procedure. You may have small sips of water, if needed, to take with your medication. You will need labs drawn prior to your procedure. Please go to [facility] to have this done no sooner than 5/12/19 and no later than 6/10/19. You should not stop your medication days prior to your scheduled procedure. After your procedure, you will need to follow up with Nurse and device clinic.  Your follow-up appointment has been scheduled for 6/21/19 at 10:15 am.

## 2019-05-07 NOTE — PROGRESS NOTES
Cardiac Electrophysiology OFFICE Note Subjective:  
  
Ronald Bence is a 82 y.o. patient who is seen for evaluation of cryptogenic stroke/TIA. She was kindly referred by Dr. Angella Steve. She has had several episodes of headache & slurred speech and/or difficulty with speech or finding words. Episodes occur approx every 6 months, & effects may last 24 hours. She now notes some lingering memory issues & difficulty finding words. Had been on ASA daily, but now on Plavix. Carotid doppler showed <50% stenosis on the right, minimal on the left. MRI brain in 2019 showed possible small focus of cortical infarct L posterior frontal lobe, resolution of L anterior frontal hemorrhage/possible SAH. No evidence of vascular malformation/aneurysm or significant stenosis on MRA. Suspected ischemic stroke, possible minimal hemorrhagic conversion. She denies chest pain, palpitations, SOB, PND, orthopnea, lightheadedness, syncope, or edema. States overall very healthy, a \"young 84\". Previous: 
Denies family history of arrhythmia, but states both parents  at 80, possibly due to coronary issues. Problem List  Date Reviewed: 2019 Codes Class Noted Gastroesophageal reflux disease without esophagitis ICD-10-CM: K21.9 ICD-9-CM: 530.81  2018 TIA (transient ischemic attack) ICD-10-CM: G45.9 ICD-9-CM: 435.9  2015 PVC (premature ventricular contraction) ICD-10-CM: I49.3 ICD-9-CM: 427.69  2015 Glaucoma ICD-10-CM: H40.9 ICD-9-CM: 365.9  2013 Hyperlipidemia ICD-10-CM: E78.5 ICD-9-CM: 272.4  2012 OAB (overactive bladder) ICD-10-CM: N32.81 ICD-9-CM: 596.51  2012 Current Outpatient Medications Medication Sig Dispense Refill  PARoxetine (PAXIL) 10 mg tablet  atorvastatin (LIPITOR) 40 mg tablet Take 1 Tab by mouth daily.  30 Tab 11  
  oxybutynin (DITROPAN) 5 mg tablet TAKE 1 TABLET THREE TIMES A  Tab 2  clobetasol 0.05 % sham 118 mL by Apply Externally route daily. 118 mL 12  
 clopidogrel (PLAVIX) 75 mg tab Take 1 Tab by mouth daily. 90 Tab 4  
 simvastatin (ZOCOR) 40 mg tablet TAKE 1 TABLET NIGHTLY 90 Tab 4  
 latanoprost (XALATAN) 0.005 % ophthalmic solution Administer 1 Drop to both eyes nightly.  donepezil (ARICEPT) 10 mg tablet Take 1 Tab by mouth nightly. 90 Tab 1 No Known Allergies Past Medical History:  
Diagnosis Date  Arthritis  Cancer (HCC)   
 squamous cell left leg  Gastroesophageal reflux disease without esophagitis 2018  Glaucoma 2013  Hyperlipidemia 2012  Postmenopausal HRT (hormone replacement therapy) 2012  PVC (premature ventricular contraction) 2015  TIA (transient ischemic attack) 2015  Uterovaginal prolapse 2/3/2012 Past Surgical History:  
Procedure Laterality Date  HX BREAST BIOPSY    
 CARMEL.  HX GI    
 COLONOSCOPY  
 HX HYSTERECTOMY X 4  
 HX ORTHOPAEDIC    
 RT. BUNIONECTOMY  HX ORTHOPAEDIC Yanni Peterson  HX TONSILLECTOMY Family History Problem Relation Age of Onset  Heart Attack Mother 80 Social History Tobacco Use  Smoking status: Former Smoker Packs/day: 1.00 Years: 14.00 Pack years: 14.00 Last attempt to quit: 1967 Years since quittin.3  Smokeless tobacco: Never Used Substance Use Topics  Alcohol use: Yes Comment: AVG 2X A MONTH Review of Systems:  
Constitutional: Negative for fever, chills, weight loss, malaise/fatigue. HEENT: Negative for nosebleeds, vision changes. Respiratory: Negative for cough, hemoptysis Cardiovascular: Negative for chest pain, palpitations, orthopnea, claudication, leg swelling, syncope, and PND. Gastrointestinal: Negative for nausea, vomiting, diarrhea, blood in stool and melena. Genitourinary: Negative for dysuria, and hematuria. Musculoskeletal: Negative for myalgias, arthralgia. Skin: Negative for rash. Heme: Does not bleed or bruise easily. Neurological: + occasional slurred speech and focal weakness. Objective:  
 
Visit Vitals /70 (BP 1 Location: Left arm, BP Patient Position: Sitting) Pulse 75 Ht 5' 4\" (1.626 m) Wt 133 lb (60.3 kg) BMI 22.83 kg/m² Physical Exam:  
Constitutional: well-developed and well-nourished. No respiratory distress. Head: Normocephalic and atraumatic. Eyes: Pupils are equal, round ENT: hearing normal 
Neck: supple. No JVD present. Cardiovascular: Normal rate, regular rhythm. Exam reveals no gallop and no friction rub. No murmur heard. Pulmonary/Chest: Effort normal and breath sounds normal. No wheezes. Abdominal: Soft, no tenderness. Musculoskeletal: no edema. Neurological: alert,oriented. Skin: Skin is warm and dry Psychiatric: normal mood and affect. Behavior is normal. Judgment and thought content normal.   
 
 
Assessment/Plan: ICD-10-CM ICD-9-CM 1. TIA (transient ischemic attack) G45.9 435.9 2. PVC (premature ventricular contraction) I49.3 427.69   
3. History of CVA (cerebrovascular accident) Z80.65 V14.48   
 
Ms. Juarez Ignacio has had multiple cryptogenic TIAs/CVAs. No significant carotid stenosis, no known atrial arrhythmia. Obtain 2D echo to check for apical thrombus. If no thrombus is found, recommend implantable loop monitor. Risks/benefits discussed, & she agrees with plan. For now, continue Plavix. Should thrombus or AF/AFL be noted, would recommend warfarin or NOAC in place of Plavix for embolic CVA prophylaxis. Future Appointments Date Time Provider Eliseo Valery 10/29/2019 11:40 AM DO GETACHEW Billingsley/ [address] Thank you for involving me in this patient's care and please call with further concerns or questions.  
 
 
Jez Antunez M.D. 
 Electrophysiology/Cardiology [address] [facility] [place], [address] [place] [address] 
[phone]

## 2019-05-08 NOTE — PROGRESS NOTES
Cardiac Electrophysiology OFFICE Note     Subjective:      Conner Xiong is a 82 y.o. patient who is seen for evaluation of cryptogenic stroke. She was kindly referred by Dr. Anabela Kenyon. She had TIA in 2018    She has had several episodes of headache & slurred speech and/or difficulty with speech or finding words. Episodes occur approx every 6 months, & effects may last 24 hours. She now notes some lingering memory issues & difficulty finding words. Had been on ASA daily, but now on Plavix. Carotid doppler showed <50% stenosis on the right, minimal on the left. MRI brain in 2019 showed possible small focus of cortical infarct L posterior frontal lobe, resolution of L anterior frontal hemorrhage/possible SAH. No evidence of vascular malformation/aneurysm or significant stenosis on MRA. Suspected ischemic stroke, possible minimal hemorrhagic conversion. She denies chest pain, palpitations, SOB, PND, orthopnea, lightheadedness, syncope, or edema. States overall very healthy, a \"young 84\". Previous:  Denies family history of arrhythmia, but states both parents  at 80, possibly due to coronary issues. Problem List  Date Reviewed: 2019          Codes Class Noted    Gastroesophageal reflux disease without esophagitis ICD-10-CM: K21.9  ICD-9-CM: 530.81  2018        TIA (transient ischemic attack) ICD-10-CM: G45.9  ICD-9-CM: 435.9  2015        PVC (premature ventricular contraction) ICD-10-CM: I49.3  ICD-9-CM: 427.69  2015        Glaucoma ICD-10-CM: H40.9  ICD-9-CM: 365.9  2013        Hyperlipidemia ICD-10-CM: E78.5  ICD-9-CM: 272.4  2012        OAB (overactive bladder) ICD-10-CM: N32.81  ICD-9-CM: 596.51  2012              Current Outpatient Medications   Medication Sig Dispense Refill    PARoxetine (PAXIL) 10 mg tablet       atorvastatin (LIPITOR) 40 mg tablet Take 1 Tab by mouth daily.  30 Tab 11    oxybutynin (DITROPAN) 5 mg tablet TAKE 1 TABLET THREE TIMES A  Tab 2    clobetasol 0.05 % sham 118 mL by Apply Externally route daily. 118 mL 12    clopidogrel (PLAVIX) 75 mg tab Take 1 Tab by mouth daily. 90 Tab 4    simvastatin (ZOCOR) 40 mg tablet TAKE 1 TABLET NIGHTLY 90 Tab 4    latanoprost (XALATAN) 0.005 % ophthalmic solution Administer 1 Drop to both eyes nightly.  donepezil (ARICEPT) 10 mg tablet Take 1 Tab by mouth nightly. 80 Tab 1     No Known Allergies  Past Medical History:   Diagnosis Date    Arthritis     Cancer ([facility] [address]     squamous cell left leg    Gastroesophageal reflux disease without esophagitis 2018    Glaucoma 2013    Hyperlipidemia 2012    Postmenopausal HRT (hormone replacement therapy) 2012    PVC (premature ventricular contraction) 2015    TIA (transient ischemic attack) 2015    Uterovaginal prolapse 2/3/2012     Past Surgical History:   Procedure Laterality Date    HX BREAST BIOPSY      CARMEL.  HX GI      COLONOSCOPY    HX HYSTERECTOMY      X 4    HX ORTHOPAEDIC      RT. BUNIONECTOMY    HX ORTHOPAEDIC      CALL'S NEUROMA    HX TONSILLECTOMY       Family History   Problem Relation Age of Onset    Heart Attack Mother 80     Social History     Tobacco Use    Smoking status: Former Smoker     Packs/day: 1.00     Years: 14.00     Pack years: 14.00     Last attempt to quit: 1967     Years since quittin.3    Smokeless tobacco: Never Used   Substance Use Topics    Alcohol use: Yes     Comment: AVG 2X A MONTH        Review of Systems:   Constitutional: Negative for fever, chills, weight loss, malaise/fatigue. HEENT: Negative for nosebleeds, vision changes. Respiratory: Negative for cough, hemoptysis  Cardiovascular: Negative for chest pain, palpitations, orthopnea, claudication, leg swelling, syncope, and PND. Gastrointestinal: Negative for nausea, vomiting, diarrhea, blood in stool and melena. Genitourinary: Negative for dysuria, and hematuria. Musculoskeletal: Negative for myalgias, arthralgia. Skin: Negative for rash. Heme: Does not bleed or bruise easily. Neurological: + occasional slurred speech and focal weakness. Objective:     Visit Vitals  /70 (BP 1 Location: Left arm, BP Patient Position: Sitting)   Pulse 75   Ht 5' 4\" (1.626 m)   Wt 133 lb (60.3 kg)   BMI 22.83 kg/m²      Physical Exam:   Constitutional: well-developed and well-nourished. No respiratory distress. Head: Normocephalic and atraumatic. Eyes: Pupils are equal, round  ENT: hearing normal  Neck: supple. No JVD present. Cardiovascular: Normal rate, regular rhythm. Exam reveals no gallop and no friction rub. No murmur heard. Pulmonary/Chest: Effort normal and breath sounds normal. No wheezes. Abdominal: Soft, no tenderness. Musculoskeletal: no edema. Neurological: alert,oriented. Skin: Skin is warm and dry  Psychiatric: normal mood and affect. Behavior is normal. Judgment and thought content normal.        Assessment/Plan:       ICD-10-CM ICD-9-CM    1. TIA (transient ischemic attack) G45.9 435.9 CBC WITH AUTOMATED DIFF      METABOLIC PANEL, BASIC   2. PVC (premature ventricular contraction) I49.3 427.69 CBC WITH AUTOMATED DIFF      METABOLIC PANEL, BASIC   3. History of CVA (cerebrovascular accident) Z86.73 V12.54 CBC WITH AUTOMATED DIFF      METABOLIC PANEL, BASIC     Ms. Kenzie Jorgensen has had multiple cryptogenic TIAs/CVAs. No significant carotid stenosis, no known atrial arrhythmia. Obtain 2D echo to check for LV apical thrombus. If no thrombus is found, recommend implantable loop monitor for PAF. Risks/benefits discussed with her and her son & she agrees with plan. For now, continue Plavix. Should thrombus or AF/AFL be noted, would recommend warfarin or NOAC in place of Plavix for embolic CVA prophylaxis.   She had small hemorrhagic conversion on brain MRI      Future Appointments   Date Time Provider Eliseo Rasmussen   5/13/2019  9:00 AM ECHO, [address]   6/21/2019 10:15 AM PACEMAKER3, [address]   6/21/2019 10:30 AM HOLTER, [address]   10/29/2019 11:40 AM Collette Pang, DO [address]       Thank you for involving me in this patient's care and please call with further concerns or questions. Kasia Noland M.D.   Electrophysiology/Cardiology  [facility] [place]  [facility], [facility] [address] [place] [address]  [phone]

## 2019-06-05 RX ORDER — SODIUM CHLORIDE 0.9 % (FLUSH) 0.9 %
5-40 SYRINGE (ML) INJECTION EVERY 8 HOURS
Status: CANCELLED | OUTPATIENT
Start: 2019-06-05

## 2019-06-05 RX ORDER — SODIUM CHLORIDE 0.9 % (FLUSH) 0.9 %
5-40 SYRINGE (ML) INJECTION AS NEEDED
Status: CANCELLED | OUTPATIENT
Start: 2019-06-05

## 2019-06-05 RX ORDER — CEFAZOLIN SODIUM/WATER 2 G/20 ML
2 SYRINGE (ML) INTRAVENOUS ONCE
Status: CANCELLED | OUTPATIENT
Start: 2019-06-05 | End: 2019-06-05

## 2019-06-07 ENCOUNTER — TELEPHONE (OUTPATIENT)
Dept: CARDIOLOGY CLINIC | Age: 84
End: 2019-06-07

## 2019-06-12 ENCOUNTER — DOCUMENTATION ONLY (OUTPATIENT)
Dept: CARDIOLOGY CLINIC | Age: 84
End: 2019-06-12

## 2019-06-12 NOTE — PROGRESS NOTES
Verified patient with two types of identifiers. Spoke with patient's son verified on HIPAA. Offered son 7/1/19 at 7:30 am; however son states that is too early for patient. Patient rescheduled for 7/1/19 at 10:30 am. Notified patient's son I will mail updated pre-procedure instructions with updated date and time. Son requests instructions be mailed to his address. Patient verbalized understanding and will call with any other questions.

## 2019-06-12 NOTE — PROGRESS NOTES
Patient's son called and said they did not get message that her loop recorder implant time was moved to 730 am  She did not check her phone messages  I had left message for her to call me back because cath lab manager moved the time of procedure to 730 am  Nurse Nikolas Summers did not know because so she called her son from the cath lab this morning  Son said he was not aware that procedure is for today even so he cannot get her here today  He wants to reschedule loop recorder implant and I told him it could be July  He said it is ok and wanted to be called to him:  [phone]

## 2019-06-12 NOTE — LETTER
6/12/2019 10:57 AM 
 
Ms. Anil Aquino [phone] [id number] Your ILR (Implantable Loop Recorder) procedure has been re-scheduled for 7/1/19 at 10:30 am, at [facility]. 
 
Please report to Admitting Department by 8:30 am, or 2 hours prior to your scheduled procedure. Please bring a list of your current medications and medication bottles, if able, to the hospital on this day. You will need labs drawn prior to your procedure. Please go to [facility] to have this done as soon as you are able. You should not stop your medication. After your procedure, you will need to follow up with Dr. J Luis Whitt nurse for a wound and device check.  Your follow-up appointment has been scheduled for 7/5/19 at 9:15 am.  
 
 
 
 
Sincerely, 
 
 
Alexys Prakash MD

## 2019-06-21 ENCOUNTER — HOSPITAL ENCOUNTER (OUTPATIENT)
Dept: LAB | Age: 84
Discharge: HOME | End: 2019-06-21
Payer: MEDICARE

## 2019-06-21 PROCEDURE — 36415 COLL VENOUS BLD VENIPUNCTURE: CPT

## 2019-06-21 PROCEDURE — 85025 COMPLETE CBC W/AUTO DIFF WBC: CPT

## 2019-06-21 PROCEDURE — 80048 BASIC METABOLIC PNL TOTAL CA: CPT

## 2019-06-22 LAB
BASOPHILS # BLD AUTO: 0 X10E3/UL (ref 0–0.2)
BASOPHILS NFR BLD AUTO: 1 %
BUN SERPL-MCNC: 13 MG/DL (ref 8–27)
BUN/CREAT SERPL: 15 (ref 12–28)
CALCIUM SERPL-MCNC: 9.4 MG/DL (ref 8.7–10.3)
CHLORIDE SERPL-SCNC: 96 MMOL/L (ref 96–106)
CO2 SERPL-SCNC: 29 MMOL/L (ref 20–29)
CREAT SERPL-MCNC: 0.89 MG/DL (ref 0.57–1)
EOSINOPHIL # BLD AUTO: 0.3 X10E3/UL (ref 0–0.4)
EOSINOPHIL NFR BLD AUTO: 4 %
ERYTHROCYTE [DISTWIDTH] IN BLOOD BY AUTOMATED COUNT: 13.9 % (ref 12.3–15.4)
GLUCOSE SERPL-MCNC: 105 MG/DL (ref 65–99)
HCT VFR BLD AUTO: 38.6 % (ref 34–46.6)
HGB BLD-MCNC: 12.6 G/DL (ref 11.1–15.9)
IMM GRANULOCYTES # BLD AUTO: 0 X10E3/UL (ref 0–0.1)
IMM GRANULOCYTES NFR BLD AUTO: 0 %
LYMPHOCYTES # BLD AUTO: 1.2 X10E3/UL (ref 0.7–3.1)
LYMPHOCYTES NFR BLD AUTO: 20 %
MCH RBC QN AUTO: 32.3 PG (ref 26.6–33)
MCHC RBC AUTO-ENTMCNC: 32.6 G/DL (ref 31.5–35.7)
MCV RBC AUTO: 99 FL (ref 79–97)
MONOCYTES # BLD AUTO: 0.6 X10E3/UL (ref 0.1–0.9)
MONOCYTES NFR BLD AUTO: 9 %
NEUTROPHILS # BLD AUTO: 4 X10E3/UL (ref 1.4–7)
NEUTROPHILS NFR BLD AUTO: 66 %
PLATELET # BLD AUTO: 203 X10E3/UL (ref 150–450)
POTASSIUM SERPL-SCNC: 4.6 MMOL/L (ref 3.5–5.2)
RBC # BLD AUTO: 3.9 X10E6/UL (ref 3.77–5.28)
SODIUM SERPL-SCNC: 138 MMOL/L (ref 134–144)
WBC # BLD AUTO: 6.1 X10E3/UL (ref 3.4–10.8)

## 2019-06-26 RX ORDER — SODIUM CHLORIDE 0.9 % (FLUSH) 0.9 %
5-40 SYRINGE (ML) INJECTION EVERY 8 HOURS
Status: CANCELLED | OUTPATIENT
Start: 2019-06-26

## 2019-06-26 RX ORDER — SODIUM CHLORIDE 0.9 % (FLUSH) 0.9 %
5-40 SYRINGE (ML) INJECTION AS NEEDED
Status: CANCELLED | OUTPATIENT
Start: 2019-06-26

## 2019-06-26 RX ORDER — CEFAZOLIN SODIUM/WATER 2 G/20 ML
2 SYRINGE (ML) INTRAVENOUS ONCE
Status: CANCELLED | OUTPATIENT
Start: 2019-06-26 | End: 2019-06-26

## 2019-07-01 ENCOUNTER — HOSPITAL ENCOUNTER (OUTPATIENT)
Age: 84
Setting detail: OUTPATIENT SURGERY
Discharge: HOME | End: 2019-07-01
Attending: INTERNAL MEDICINE | Admitting: INTERNAL MEDICINE
Payer: MEDICARE

## 2019-07-01 VITALS
TEMPERATURE: 98.4 F | WEIGHT: 139 LBS | BODY MASS INDEX: 23.73 KG/M2 | HEIGHT: 64 IN | HEART RATE: 54 BPM | RESPIRATION RATE: 18 BRPM | SYSTOLIC BLOOD PRESSURE: 112 MMHG | OXYGEN SATURATION: 99 % | DIASTOLIC BLOOD PRESSURE: 40 MMHG

## 2019-07-01 DIAGNOSIS — Z95.818 STATUS POST PLACEMENT OF IMPLANTABLE LOOP RECORDER: Primary | ICD-10-CM

## 2019-07-01 DIAGNOSIS — G45.9 TIA (TRANSIENT ISCHEMIC ATTACK): ICD-10-CM

## 2019-07-01 PROCEDURE — 33285 INSJ SUBQ CAR RHYTHM MNTR: CPT | Performed by: INTERNAL MEDICINE

## 2019-07-01 PROCEDURE — 77030018673: Performed by: INTERNAL MEDICINE

## 2019-07-01 PROCEDURE — 77030031139 HC SUT VCRL2 J&J -A: Performed by: INTERNAL MEDICINE

## 2019-07-01 PROCEDURE — 74011000250 HC RX REV CODE- 250: Performed by: INTERNAL MEDICINE

## 2019-07-01 PROCEDURE — C1764 EVENT RECORDER, CARDIAC: HCPCS

## 2019-07-01 PROCEDURE — 77030019580 HC CBL PACE MEDT -B: Performed by: INTERNAL MEDICINE

## 2019-07-01 PROCEDURE — C1764 EVENT RECORDER, CARDIAC: HCPCS | Performed by: INTERNAL MEDICINE

## 2019-07-01 PROCEDURE — 77030028698 HC BLD TISS PLSM MEDT -D: Performed by: INTERNAL MEDICINE

## 2019-07-01 DEVICE — MON LNQ11 REVEAL LINQ USA FW2.0
Type: IMPLANTABLE DEVICE | Status: FUNCTIONAL
Brand: REVEAL LINQ™

## 2019-07-01 RX ORDER — CEPHALEXIN 250 MG/1
250 CAPSULE ORAL 3 TIMES DAILY
Qty: 9 CAP | Refills: 0 | Status: SHIPPED | OUTPATIENT
Start: 2019-07-01 | End: 2019-07-04

## 2019-07-01 RX ORDER — SODIUM CHLORIDE 0.9 % (FLUSH) 0.9 %
5-40 SYRINGE (ML) INJECTION EVERY 8 HOURS
Status: DISCONTINUED | OUTPATIENT
Start: 2019-07-01 | End: 2019-07-01 | Stop reason: HOSPADM

## 2019-07-01 RX ORDER — SODIUM CHLORIDE 0.9 % (FLUSH) 0.9 %
5-40 SYRINGE (ML) INJECTION AS NEEDED
Status: DISCONTINUED | OUTPATIENT
Start: 2019-07-01 | End: 2019-07-01 | Stop reason: HOSPADM

## 2019-07-01 RX ORDER — CEFAZOLIN SODIUM/WATER 2 G/20 ML
2 SYRINGE (ML) INTRAVENOUS ONCE
Status: DISCONTINUED | OUTPATIENT
Start: 2019-07-01 | End: 2019-07-01

## 2019-07-01 RX ADMIN — LIDOCAINE HYDROCHLORIDE 15 MG: 10; .005 INJECTION, SOLUTION EPIDURAL; INFILTRATION; INTRACAUDAL; PERINEURAL at 10:58

## 2019-07-01 NOTE — H&P
Cardiac Electrophysiology H&P Note     Subjective:      Ruby Amaral is a 82 y.o. patient who presents today for planned implantable loop recorder insertion. She was last seen in clinic on 2019, denies significant changes in past medical history or hospitalizastions since then. Referred by Dr. Yusuf Mcdonald after cryptogenic stroke, TIA in 2018. Several episodes of headache & slurred speech and/or difficulty with speech or finding words. Episodes occur approx every 6 months, & effects may last 24 hours. She now notes some lingering memory issues & difficulty finding words. Had been on ASA daily, but now on Plavix. She denies chest pain, palpitations, SOB, PND, orthopnea, lightheadedness, syncope, or edema. States overall very healthy, a \"young 84\". Previous:  Echo (2019): LVEF 89%, grade 1 diastolic dysfunction, mild concentric LVH, false tendon noted, mildly thickened anterior mitral papillary muscle. LA mildly dilated. Prominent Chiari network in RA. Mild MAC, trace MR. Mild aortic valve sclerosis without significant stenosis. Trace TR. Trace NJ. Carotid doppler (2019): <50% stenosis on the right, minimal on the left. MRI brain (2019): possible small focus of cortical infarct L posterior frontal lobe, resolution of L anterior frontal hemorrhage/possible SAH.  No evidence of vascular malformation/aneurysm or significant stenosis on MRA.  Suspected ischemic stroke, possible minimal hemorrhagic conversion. Denies family history of arrhythmia, but states both parents  at 80, possibly due to coronary issues.         Problem List  Date Reviewed: 2019          Codes Class Noted    Gastroesophageal reflux disease without esophagitis ICD-10-CM: K21.9  ICD-9-CM: 530.81  2018        TIA (transient ischemic attack) ICD-10-CM: G45.9  ICD-9-CM: 435.9  2015        PVC (premature ventricular contraction) ICD-10-CM: I49.3  ICD-9-CM: 427.69 2015        Glaucoma ICD-10-CM: H40.9  ICD-9-CM: 365.9  2013        Hyperlipidemia ICD-10-CM: E78.5  ICD-9-CM: 272.4  2012        OAB (overactive bladder) ICD-10-CM: N32.81  ICD-9-CM: 596.51  2012                No Known Allergies  Past Medical History:   Diagnosis Date    Arthritis     Cancer ([facility] [address]     squamous cell left leg    Gastroesophageal reflux disease without esophagitis 2018    Glaucoma 2013    Hyperlipidemia 2012    Postmenopausal HRT (hormone replacement therapy) 2012    PVC (premature ventricular contraction) 2015    TIA (transient ischemic attack) 2015    Uterovaginal prolapse 2/3/2012     Past Surgical History:   Procedure Laterality Date    HX BREAST BIOPSY      CARMEL.  HX GI      COLONOSCOPY    HX HYSTERECTOMY      X 4    HX ORTHOPAEDIC      RT. BUNIONECTOMY    HX ORTHOPAEDIC      CALL'S NEUROMA    HX TONSILLECTOMY       Family History   Problem Relation Age of Onset    Heart Attack Mother 80     Social History     Tobacco Use    Smoking status: Former Smoker     Packs/day: 1.00     Years: 14.00     Pack years: 14.00     Last attempt to quit: 1967     Years since quittin.4    Smokeless tobacco: Never Used   Substance Use Topics    Alcohol use: Yes     Comment: AVG 2X A MONTH        Review of Systems:   Constitutional: Negative for fever, chills, weight loss, malaise/fatigue. HEENT: Negative for nosebleeds, vision changes. Respiratory: Negative for cough, hemoptysis  Cardiovascular: Negative for chest pain, palpitations, orthopnea, claudication, leg swelling, syncope, and PND. Gastrointestinal: Negative for nausea, vomiting, diarrhea, blood in stool and melena. Genitourinary: Negative for dysuria, and hematuria. Musculoskeletal: Negative for myalgias, arthralgia. Skin: Negative for rash. Heme: Does not bleed or bruise easily. Neurological: + occasional slurred speech and no focal weakness.   + recurrent headaches, lingering memory issues     Objective: There were no vitals taken for this visit. Physical Exam:   Constitutional: well-developed and well-nourished. No respiratory distress. Head: Normocephalic and atraumatic. Eyes: Pupils are equal, round  ENT: hearing normal  Neck: supple. No JVD present. Cardiovascular: Normal rate, regular rhythm. Exam reveals no gallop and no friction rub. No murmur heard. Pulmonary/Chest: Effort normal and breath sounds normal. No wheezes. Abdominal: Soft, no tenderness. Musculoskeletal: no edema. Neurological: alert,oriented. Skin: Skin is warm and dry  Psychiatric: normal mood and affect. Behavior is normal. Judgment and thought content normal.          Assessment/Plan:   Ms. Anju Velazco has had multiple cryptogenic TIAs/CVAs. No significant carotid stenosis, no known atrial arrhythmia. Echo in 05/2019 showed no shunting or LV apical thrombus. Currently on Plavix. Should AF/AFL be noted on ILR, would recommend warfarin or NOAC in place of Plavix for embolic CVA prophylaxis. Risks/benefits of ILR implant for long-term surveillance for atrial fibrillation or atrial flutter reviewed, & she would like to proceed as scheduled. SANDRA Ramirez  [facility]  07/01/19    Addendum from EP attending:   I have seen, examined patient, and discussed with nurse practitioner, registered nurse, reviewed, updated note and agree with the assessment and plan    I have talked to her this am. She is feeling fine   Vital signs are stable  Exam shows regular rhythm and slow rate     Assessment and Plan:  Continue to proceed with loop recorder implant for cryptogenic stroke with possible embolic mechanism and there is concern for PAF      Thank you for involving me in this patient's care and please call with further concerns or questions. Darby Devine M.D.   Electrophysiology/Cardiology  [facility] [place], [facility] [address]  [facility], [address]                             [address] [place] [address]  [phone]

## 2019-07-01 NOTE — ROUTINE PROCESS
Cardiac Cath Lab Recovery Arrival Note:      Benita Hashimoto arrived to Cardiac Cath Lab, Recovery Area. Staff introduced to patient. Patient identifiers verified with NAME and DATE OF BIRTH. Procedure verified with patient. Consent forms reviewed and signed by patient or authorized representative and verified. Allergies verified. Patient informed of procedure and plan of care. Questions answered with review. Patient prepped for procedure, per orders from physician, prior to arrival.    Patient on cardiac monitor, non-invasive blood pressure, SPO2 monitor. Patient is A&Ox 4. Patient reports no pain, no chest pain, no n/v. Patient in stretcher, in low position, with side rails up, call bell within reach, patient instructed to call of assistance as needed. Patient prep in: [facility] Recovery Area, Bed# 3. Family in: Son: Rodney Louis [phone].    Prep by: Chela Hernández RN

## 2019-07-01 NOTE — PROCEDURES
Cardiac Procedure Note   Patient: Cassi Barroso  MRN: [id number]  SSN: [id number]   YOB: 1935 Age: 82 y.o.   Sex: female    Date of Procedure: 7/1/2019   Pre-procedure Diagnosis: cryptogenic stroke with probable embolic mechanism  Post-procedure Diagnosis: same  Procedure: loop recorder implant  :  Dr. Maggie Zapien MD    Assistant(s):  None  Anesthesia: local anesthesia  Estimated Blood Loss: minimal  Specimens Removed: None  Findings: ILR in left lower chest  Complications: None   Implants: [employer] ILR  Signed by:  Maggie Zapien MD  7/1/2019  11:12 AM

## 2019-07-01 NOTE — PROGRESS NOTES
I have reviewed discharge instructions with the patient and patient's son. The patient and patient's son verbalized understanding. Copy of discharge instructions given to patient. [employer] Representative present to go over home monitoring device.

## 2019-07-01 NOTE — DISCHARGE INSTRUCTIONS
Discharge Instructions Post Implantable Loop Recorder      You may remove your dressing the day following the procedure. You may shower the day following your procedure. Do not attempt to peel or scrub skin glue. This will degrade on its own over time. Future Appointments   Date Time Provider Eliseo Rasmussen   7/5/2019  9:15 AM PACEMAKER3, [address] [facility]   7/5/2019  9:30 AM WOUND CHECK, [address]   10/29/2019 11:40 AM Sommer Wright M.D.   Electrophysiology/Cardiology  [facility] [place]  [address], [facility] [address] [place] [address], [address] [place] [address]  [phone]

## 2019-07-05 ENCOUNTER — CLINICAL SUPPORT (OUTPATIENT)
Dept: CARDIOLOGY CLINIC | Age: 84
End: 2019-07-05

## 2019-07-05 DIAGNOSIS — Z95.818 STATUS POST PLACEMENT OF IMPLANTABLE LOOP RECORDER: Primary | ICD-10-CM

## 2019-07-05 NOTE — PROGRESS NOTES
Cardiac Electrophysiology Wound Check Note      Wound Check   Patient is here for wound check s/p ILR placement. No fever, drainage   Physical Exam   Constitutional: well-developed and well-nourished. Skin: Mid chest incision is healing without redness, drainage, hematoma. The wound is intact with skin glue. ASSESSMENT and PLAN   The incision is healing without redness, drainage, hematoma. Intact with skin glue.   The patient has finished their anti-biotic   Current treatment plan is effective, no change in therapy   Device check shows proper lead and generator functions    Follow-up Disposition:  Return 3 months I will check via device clinic or remote monitoring in the future

## 2019-09-17 ENCOUNTER — OFFICE VISIT (OUTPATIENT)
Dept: CARDIOLOGY CLINIC | Age: 84
End: 2019-09-17

## 2019-09-17 DIAGNOSIS — Z95.818 STATUS POST PLACEMENT OF IMPLANTABLE LOOP RECORDER: Primary | ICD-10-CM

## 2019-12-02 ENCOUNTER — OFFICE VISIT (OUTPATIENT)
Dept: CARDIOLOGY CLINIC | Age: 84
End: 2019-12-02

## 2019-12-02 DIAGNOSIS — Z95.818 STATUS POST PLACEMENT OF IMPLANTABLE LOOP RECORDER: Primary | ICD-10-CM

## 2019-12-19 ENCOUNTER — TELEPHONE (OUTPATIENT)
Dept: CARDIOLOGY CLINIC | Age: 84
End: 2019-12-19

## 2019-12-19 NOTE — TELEPHONE ENCOUNTER
LVM for pt that her [employer] box has become disconnected. Asked that she please try plugging it back in and to call the office if she has any questions.

## 2020-01-03 ENCOUNTER — OFFICE VISIT (OUTPATIENT)
Dept: CARDIOLOGY CLINIC | Age: 85
End: 2020-01-03

## 2020-01-03 DIAGNOSIS — Z95.818 STATUS POST PLACEMENT OF IMPLANTABLE LOOP RECORDER: Primary | ICD-10-CM

## 2020-01-15 ENCOUNTER — TELEPHONE (OUTPATIENT)
Dept: NEUROLOGY | Age: 85
End: 2020-01-15

## 2020-01-15 NOTE — TELEPHONE ENCOUNTER
Left message for son that paper work was received and that we ask for 10 business days for completion. To call back with any questions.

## 2020-01-15 NOTE — TELEPHONE ENCOUNTER
Pt's son calling about dropping off some paperwork that needed to be filled out, dropped off POA as well on Monday, wanted to know what the status is. Please call back.

## 2020-01-17 ENCOUNTER — TELEPHONE (OUTPATIENT)
Dept: NEUROLOGY | Age: 85
End: 2020-01-17

## 2020-01-17 NOTE — TELEPHONE ENCOUNTER
Left message for son that paper work was completed. To call back to let me know if he wanted me to fax it, or if he wanted to come by the office and pick it up.

## 2020-01-21 ENCOUNTER — DOCUMENTATION ONLY (OUTPATIENT)
Dept: CARDIOLOGY CLINIC | Age: 85
End: 2020-01-21

## 2020-01-21 NOTE — PROGRESS NOTES
ILR with AF 12/29-30/2019    Recommend to change plavix to eliquis 5 mg bid    Future Appointments   Date Time Provider Eliseo Rasmussen   1/31/2020  2:00 PM Norma Plata MD [facility] SCHED   7/9/2020  1:00 PM Nannette Dee  [address]       Current Outpatient Medications on File Prior to Visit   Medication Sig Dispense Refill    atorvastatin (LIPITOR) 40 mg tablet Take 1 Tab by mouth daily. 30 Tab 11    memantine (NAMENDA) 5 mg tablet Take 1 Tab by mouth two (2) times a day. 60 Tab 5    PARoxetine (PAXIL) 10 mg tablet       donepezil (ARICEPT) 10 mg tablet Take 1 Tab by mouth nightly. 90 Tab 1    oxybutynin (DITROPAN) 5 mg tablet TAKE 1 TABLET THREE TIMES A  Tab 2    clobetasol 0.05 % sham 118 mL by Apply Externally route daily. 118 mL 12    clopidogrel (PLAVIX) 75 mg tab Take 1 Tab by mouth daily. 90 Tab 4    latanoprost (XALATAN) 0.005 % ophthalmic solution Administer 1 Drop to both eyes nightly. No current facility-administered medications on file prior to visit.

## 2020-01-21 NOTE — PROGRESS NOTES
Attempted to reach patient. Number on file is patient's granddaughter. Granddaughter requested I call her father, Geena Farnsworth. Geena Farnsworth is on HIPAA form. Verified phone number. Attempted to reach patient's son by telephone. A message was left for return call.

## 2020-01-27 ENCOUNTER — OFFICE VISIT (OUTPATIENT)
Dept: CARDIOLOGY CLINIC | Age: 85
End: 2020-01-27

## 2020-01-27 DIAGNOSIS — Z95.818 STATUS POST PLACEMENT OF IMPLANTABLE LOOP RECORDER: Primary | ICD-10-CM

## 2020-01-28 ENCOUNTER — DOCUMENTATION ONLY (OUTPATIENT)
Dept: CARDIOLOGY CLINIC | Age: 85
End: 2020-01-28

## 2020-01-28 NOTE — PROGRESS NOTES
Verified patient with two types of identifiers. Spoke with patient's son, verified on HIPAA. Notified Chad Girard of ILR findings. Notified Chad Girard, patient to stop Plavix and start Eliquis 5 mg BID. Pharmacy confirmed. Notified Chad Girard he can come  a coupon for free first 30 days. Chad Girard states he will try and come by this afternoon. Patient's son verbalized understanding and will call with any other questions.

## 2020-01-28 NOTE — PROGRESS NOTES
Pt. Son Two patient identifiers confirmed come to office. Son picked up 30 day free trial card and PAF. Son will fax forms back when they are filled out. Pt verbalized understanding of information discussed w/ no further questions at this time.

## 2020-01-31 NOTE — TELEPHONE ENCOUNTER
Request for Eliquis 5 mg BID. Last office visit 7/1/19, next office visit 7/9/20. Refills per verbal order from Dr. Federico South.

## 2020-02-11 ENCOUNTER — OFFICE VISIT (OUTPATIENT)
Dept: CARDIOLOGY CLINIC | Age: 85
End: 2020-02-11

## 2020-02-11 DIAGNOSIS — Z95.818 STATUS POST PLACEMENT OF IMPLANTABLE LOOP RECORDER: Primary | ICD-10-CM

## 2020-02-17 ENCOUNTER — DOCUMENTATION ONLY (OUTPATIENT)
Dept: CARDIOLOGY CLINIC | Age: 85
End: 2020-02-17

## 2020-02-17 DIAGNOSIS — I49.3 PVC (PREMATURE VENTRICULAR CONTRACTION): Primary | ICD-10-CM

## 2020-02-17 RX ORDER — METOPROLOL SUCCINATE 25 MG/1
25 TABLET, EXTENDED RELEASE ORAL DAILY
Qty: 90 TAB | Refills: 3 | OUTPATIENT
Start: 2020-02-17 | End: 2020-02-19 | Stop reason: SDUPTHER

## 2020-02-17 NOTE — PROGRESS NOTES
Called Pt. Son Nancy Otoole two patient identifiers confirmed. Notified Marshal that about finding on ILR and Dr. Adina Livingston recommendations. Pt verbalized understanding of information discussed w/ no further questions at this time. Cardiologist: Dr. Adina Livingston    Last appt: 2/11/2020  Future Appointments   Date Time Provider Eliseo Valery   3/27/2020  2:45 PM Rosie Poole MD [facility] SCHED   7/9/2020  1:00 PM Jeremias Faustin  [address]       Requested Prescriptions     Signed Prescriptions Disp Refills    metoprolol succinate (TOPROL-XL) 25 mg XL tablet 90 Tab 3     Sig: Take 1 Tab by mouth daily. Refills VO per Dr. Adina Livingston.

## 2020-02-17 NOTE — PROGRESS NOTES
Atrial flutter RVR on implanted loop recorder  Has been on eliquis but need toprol XL 25 mg every day for rate control    Current Outpatient Medications on File Prior to Visit   Medication Sig Dispense Refill    PARoxetine (PAXIL) 10 mg tablet TAKE 1 TABLET DAILY 90 Tab 4    memantine (NAMENDA) 10 mg tablet Take 1 Tab by mouth daily. 60 Tab 11    apixaban (ELIQUIS) 5 mg tablet Take 1 Tab by mouth two (2) times a day. 60 Tab 5    atorvastatin (LIPITOR) 40 mg tablet Take 1 Tab by mouth daily. 30 Tab 11    donepezil (ARICEPT) 10 mg tablet Take 1 Tab by mouth nightly. 90 Tab 1    oxybutynin (DITROPAN) 5 mg tablet TAKE 1 TABLET THREE TIMES A  Tab 2    clobetasol 0.05 % sham 118 mL by Apply Externally route daily. 118 mL 12    latanoprost (XALATAN) 0.005 % ophthalmic solution Administer 1 Drop to both eyes nightly. No current facility-administered medications on file prior to visit.

## 2020-02-19 ENCOUNTER — TELEPHONE (OUTPATIENT)
Dept: CARDIOLOGY CLINIC | Age: 85
End: 2020-02-19

## 2020-02-19 DIAGNOSIS — I49.3 PVC (PREMATURE VENTRICULAR CONTRACTION): ICD-10-CM

## 2020-02-19 RX ORDER — METOPROLOL SUCCINATE 25 MG/1
25 TABLET, EXTENDED RELEASE ORAL DAILY
Qty: 90 TAB | Refills: 3 | Status: SHIPPED | OUTPATIENT
Start: 2020-02-19 | End: 2020-03-27 | Stop reason: SDUPTHER

## 2020-02-19 NOTE — TELEPHONE ENCOUNTER
Attempted to reach patient's son by telephone. A message was left notifying that prescription has been re sent to the pharmacy and that I called the pharmacy to confirm that they received. Son to call back with any other questions or concerns.

## 2020-02-19 NOTE — TELEPHONE ENCOUNTER
Patients son is calling as he was advised that metoprolol was being sent in for the patient. I see where it was called into the pharmacy on [address] patients son states they have not received this mediation. Please resubmit to the pharmacy. Thanks.      Phone: [phone]

## 2020-04-07 ENCOUNTER — OFFICE VISIT (OUTPATIENT)
Dept: CARDIOLOGY CLINIC | Age: 85
End: 2020-04-07

## 2020-04-07 DIAGNOSIS — Z95.818 STATUS POST PLACEMENT OF IMPLANTABLE LOOP RECORDER: Primary | ICD-10-CM

## 2020-05-11 ENCOUNTER — OFFICE VISIT (OUTPATIENT)
Dept: CARDIOLOGY CLINIC | Age: 85
End: 2020-05-11

## 2020-05-11 DIAGNOSIS — Z95.818 STATUS POST PLACEMENT OF IMPLANTABLE LOOP RECORDER: Primary | ICD-10-CM

## 2020-06-12 ENCOUNTER — OFFICE VISIT (OUTPATIENT)
Dept: CARDIOLOGY CLINIC | Age: 85
End: 2020-06-12

## 2020-06-12 DIAGNOSIS — Z95.818 STATUS POST PLACEMENT OF IMPLANTABLE LOOP RECORDER: Primary | ICD-10-CM

## 2020-07-06 ENCOUNTER — TELEPHONE (OUTPATIENT)
Dept: CARDIOLOGY CLINIC | Age: 85
End: 2020-07-06

## 2020-07-06 NOTE — TELEPHONE ENCOUNTER
Left a voice message on 7/6/2020 requesting a return call to re-schedule 7/9/2020 appt with Dr. Abhijit Alanis.

## 2020-07-07 NOTE — TELEPHONE ENCOUNTER
Left 3x voice messages on 7/7/2020 requesting a return call to re-schedule 7/9/2020 appt with Dr. Josh Schaefer.

## 2020-07-07 NOTE — TELEPHONE ENCOUNTER
Left another voice message on 7/7/2020 requesting a return call to re-schedule 7/9/2020 appt with Dr. Obdulio Santos.

## 2021-01-05 ENCOUNTER — TELEPHONE (OUTPATIENT)
Dept: NEUROLOGY | Age: 86
End: 2021-01-05

## 2021-01-05 NOTE — TELEPHONE ENCOUNTER
Pt's son calling in regards to paperwork that he faxed over about 2 weeks ago, stated paperwork was in regards to her long term care policy. Please call.

## 2021-01-06 PROBLEM — G30.9 ALZHEIMER'S DISEASE (HCC): Chronic | Status: ACTIVE | Noted: 2021-01-06

## 2021-01-06 PROBLEM — F01.50 VASCULAR DEMENTIA WITHOUT BEHAVIORAL DISTURBANCE (HCC): Chronic | Status: ACTIVE | Noted: 2021-01-06

## 2021-01-06 PROBLEM — F02.80 ALZHEIMER'S DISEASE (HCC): Chronic | Status: ACTIVE | Noted: 2021-01-06

## 2021-01-07 NOTE — TELEPHONE ENCOUNTER
----- Message from Winnie Pace sent at 2021  1:17 PM EST -----  Regarding: Dr. Rolla Apgar Message/Vendor Calls    Caller's first and last name:  Iveth García, Son, [place]      Reason for call:   Status of Long Term Care Paper work      Callback required yes/no and why:  Yes, faxed 5-pages (including handwritten cover sheet)      Best contact number(s):  [phone]      Details to clarify the request:  Carla Padilla stated, documents were faxed over 2 wks ago, time is of the essence, deadline needed no later than 21 to pt's insurance  Mr. Carla Zee advised, he f/up on Tues requesting a call back with the status, no response to date.       Winnie Pace

## 2021-02-17 ENCOUNTER — OFFICE VISIT (OUTPATIENT)
Dept: CARDIOLOGY CLINIC | Age: 86
End: 2021-02-17
Payer: MEDICARE

## 2021-02-17 DIAGNOSIS — Z95.818 STATUS POST PLACEMENT OF IMPLANTABLE LOOP RECORDER: Primary | ICD-10-CM

## 2021-02-17 PROCEDURE — 93291 INTERROG DEV EVAL SCRMS IP: CPT

## 2021-02-17 PROCEDURE — 93298 REM INTERROG DEV EVAL SCRMS: CPT | Performed by: INTERNAL MEDICINE

## 2021-05-10 ENCOUNTER — TELEPHONE (OUTPATIENT)
Dept: CARDIOLOGY CLINIC | Age: 86
End: 2021-05-10

## 2021-08-13 ENCOUNTER — OFFICE VISIT (OUTPATIENT)
Dept: CARDIOLOGY CLINIC | Age: 86
End: 2021-08-13
Payer: MEDICARE

## 2021-08-13 DIAGNOSIS — Z95.818 STATUS POST PLACEMENT OF IMPLANTABLE LOOP RECORDER: Primary | ICD-10-CM

## 2021-08-13 PROCEDURE — 93298 REM INTERROG DEV EVAL SCRMS: CPT | Performed by: INTERNAL MEDICINE

## 2021-11-15 PROCEDURE — 93298 REM INTERROG DEV EVAL SCRMS: CPT | Performed by: INTERNAL MEDICINE

## 2021-11-19 ENCOUNTER — OFFICE VISIT (OUTPATIENT)
Dept: CARDIOLOGY CLINIC | Age: 86
End: 2021-11-19
Payer: MEDICARE

## 2021-11-19 DIAGNOSIS — Z95.818 STATUS POST PLACEMENT OF IMPLANTABLE LOOP RECORDER: Primary | ICD-10-CM

## 2022-02-18 ENCOUNTER — OFFICE VISIT (OUTPATIENT)
Dept: CARDIOLOGY CLINIC | Age: 87
End: 2022-02-18
Payer: MEDICARE

## 2022-02-18 DIAGNOSIS — Z95.818 STATUS POST PLACEMENT OF IMPLANTABLE LOOP RECORDER: Primary | ICD-10-CM

## 2022-02-18 PROCEDURE — 93298 REM INTERROG DEV EVAL SCRMS: CPT | Performed by: INTERNAL MEDICINE

## 2022-03-19 PROBLEM — F01.50 VASCULAR DEMENTIA WITHOUT BEHAVIORAL DISTURBANCE (HCC): Status: ACTIVE | Noted: 2021-01-06

## 2022-03-19 PROBLEM — G30.9 ALZHEIMER'S DISEASE (HCC): Status: ACTIVE | Noted: 2021-01-06

## 2022-03-19 PROBLEM — F02.80 ALZHEIMER'S DISEASE (HCC): Status: ACTIVE | Noted: 2021-01-06

## 2022-03-19 PROBLEM — K21.9 GASTROESOPHAGEAL REFLUX DISEASE WITHOUT ESOPHAGITIS: Status: ACTIVE | Noted: 2018-06-12

## 2022-03-19 PROBLEM — Z95.818 STATUS POST PLACEMENT OF IMPLANTABLE LOOP RECORDER: Status: ACTIVE | Noted: 2019-07-01

## 2022-05-05 ENCOUNTER — TELEPHONE (OUTPATIENT)
Dept: CARDIOLOGY CLINIC | Age: 87
End: 2022-05-05

## 2022-05-16 PROCEDURE — 93298 REM INTERROG DEV EVAL SCRMS: CPT | Performed by: INTERNAL MEDICINE

## 2022-05-20 ENCOUNTER — OFFICE VISIT (OUTPATIENT)
Dept: CARDIOLOGY CLINIC | Age: 87
End: 2022-05-20
Payer: MEDICARE

## 2022-05-20 DIAGNOSIS — Z95.818 STATUS POST PLACEMENT OF IMPLANTABLE LOOP RECORDER: Primary | ICD-10-CM

## 2022-08-15 ENCOUNTER — OFFICE VISIT (OUTPATIENT)
Dept: CARDIOLOGY CLINIC | Age: 87
End: 2022-08-15
Payer: MEDICARE

## 2022-08-15 DIAGNOSIS — Z95.818 STATUS POST PLACEMENT OF IMPLANTABLE LOOP RECORDER: Primary | ICD-10-CM

## 2022-08-15 PROCEDURE — 93298 REM INTERROG DEV EVAL SCRMS: CPT | Performed by: INTERNAL MEDICINE

## 2022-08-15 NOTE — LETTER
8/19/2022     Ms. Rianna Davis  [address] [address]              Dear Ms. Rianna Davis,    We have received the remote transmission for your implanted loop recorder dated 8-15-22. You have had no unusual heart rate episodes recorded. Your battery status is \"OK\". Your next scheduled remote transmission is 9-16-22. We will continue to monitor your device remotely as long as your home monitor remains plugged into power. Thank you for remaining connected remotely!         Sincerely,    [facility]

## 2022-08-19 NOTE — PROGRESS NOTES
Scheduled ILR remote transmission. No events since 5/10/22.   Chargeable visit  See scanned documents

## 2022-10-14 ENCOUNTER — OFFICE VISIT (OUTPATIENT)
Dept: CARDIOLOGY CLINIC | Age: 87
End: 2022-10-14
Payer: MEDICARE

## 2022-10-14 DIAGNOSIS — Z95.818 STATUS POST PLACEMENT OF IMPLANTABLE LOOP RECORDER: Primary | ICD-10-CM

## 2022-10-14 PROCEDURE — 93298 REM INTERROG DEV EVAL SCRMS: CPT | Performed by: INTERNAL MEDICINE

## 2022-10-14 NOTE — PROGRESS NOTES
Chargeable implanted loop remote QM    Since 8/26/22 no events recorded. Presenting rhythm at 00:05 am SB 40-50's. See scanned report in  for details.

## 2022-10-14 NOTE — LETTER
10/14/2022 11:14 AM    Ms. Cj Cabral  [address] [address]    Dear Ms. Cj Cabral,    We have received the remote transmission for your implanted loop recorder dated 10/14/22. You have had no unusual heart rate episodes recorded. Your battery status is \"OK\". Your next scheduled remote transmission is 11/18/2022. We will continue to monitor your device remotely as long as your home monitor remains plugged into power. Thank you for remaining connected remotely!     Sincerely,    Samantha Thapa RN, BSN  Device Coordinator RN  [facility]  [phone]  [facility]  [phone]

## 2022-11-28 ENCOUNTER — OFFICE VISIT (OUTPATIENT)
Dept: CARDIOLOGY CLINIC | Age: 87
End: 2022-11-28

## 2022-11-28 DIAGNOSIS — Z95.818 PRESENCE OF CARDIAC DEVICE: Primary | ICD-10-CM

## 2022-12-27 PROBLEM — F03.90 DEMENTIA (HCC): Status: ACTIVE | Noted: 2021-01-06

## 2023-03-06 NOTE — PROGRESS NOTES
NC/ MDT ILR   Scheduled remote transmission. Device functioning appropriately as programmed. See scanned documents.  Chargeable visit

## 2023-06-06 ENCOUNTER — TELEPHONE (OUTPATIENT)
Age: 88
End: 2023-06-06

## 2023-06-06 NOTE — TELEPHONE ENCOUNTER
[facility] is calling because the patient has some type of implant device in her and they need to make sure it is turned off or not working. Please call back as soon as possible. Pt passed away on 5/31/23 at [address] [facility].     [address]

## (undated) DEVICE — PENCIL ES L3M BTTN SWCH S STL HEX LOK BLDE ELECTRD HOLSTER

## (undated) DEVICE — REM POLYHESIVE ADULT PATIENT RETURN ELECTRODE: Brand: VALLEYLAB

## (undated) DEVICE — CABLE PACE ALGTR CLP SAF 12FT --

## (undated) DEVICE — PLASMABLADE PS200-040 4.0: Brand: PLASMABLADE™

## (undated) DEVICE — 3M™ IOBAN™ 2 ANTIMICROBIAL INCISE DRAPE 6640EZ: Brand: IOBAN™ 2

## (undated) DEVICE — DRAPE SURG W25XL50IN E OPN CIR BND BG

## (undated) DEVICE — SUTURE COAT VCRL PC 5 PRECIS COSM CONVENTIONAL CUT PRIM 3 8 J823H

## (undated) DEVICE — PACK PROCEDURE SURG HRT CATH

## (undated) DEVICE — KENDALL RADIOLUCENT FOAM MONITORING ELECTRODE RECTANGULAR SHAPE: Brand: KENDALL